# Patient Record
Sex: FEMALE | Race: WHITE | NOT HISPANIC OR LATINO | Employment: OTHER | URBAN - METROPOLITAN AREA
[De-identification: names, ages, dates, MRNs, and addresses within clinical notes are randomized per-mention and may not be internally consistent; named-entity substitution may affect disease eponyms.]

---

## 2023-04-22 PROBLEM — M85.9 DISORDER OF BONE DENSITY AND STRUCTURE, UNSPECIFIED: Status: ACTIVE | Noted: 2023-04-22

## 2023-04-22 PROBLEM — R59.0 LYMPHADENOPATHY, INGUINAL: Status: ACTIVE | Noted: 2023-04-22

## 2023-04-22 PROBLEM — M76.829 PTTD (POSTERIOR TIBIAL TENDON DYSFUNCTION): Status: ACTIVE | Noted: 2023-04-22

## 2023-04-22 PROBLEM — E55.9 VITAMIN D DEFICIENCY: Status: ACTIVE | Noted: 2023-04-22

## 2023-04-22 PROBLEM — Z00.00 ROUTINE ADULT HEALTH MAINTENANCE: Status: ACTIVE | Noted: 2023-04-22

## 2023-04-22 PROBLEM — Z71.89 CARDIAC RISK COUNSELING: Status: ACTIVE | Noted: 2023-04-22

## 2023-04-22 PROBLEM — I87.8 VENOUS STASIS OF LOWER EXTREMITY: Status: ACTIVE | Noted: 2023-04-22

## 2023-04-22 PROBLEM — Z87.19 H/O ESOPHAGITIS: Status: ACTIVE | Noted: 2023-04-22

## 2023-04-22 PROBLEM — J30.89 OTHER ALLERGIC RHINITIS: Status: ACTIVE | Noted: 2023-04-22

## 2023-04-22 PROBLEM — D50.0 IRON DEFICIENCY ANEMIA DUE TO CHRONIC BLOOD LOSS: Status: ACTIVE | Noted: 2023-04-22

## 2023-04-22 PROBLEM — I10 HYPERTENSION, ESSENTIAL: Status: ACTIVE | Noted: 2023-04-22

## 2023-04-22 PROBLEM — K21.9 GERD WITHOUT ESOPHAGITIS: Status: ACTIVE | Noted: 2023-04-22

## 2023-04-22 PROBLEM — H91.93 HEARING LOSS, BILATERAL: Status: ACTIVE | Noted: 2023-04-22

## 2023-04-22 PROBLEM — Z13.89 SCREENING FOR MULTIPLE CONDITIONS: Status: ACTIVE | Noted: 2023-04-22

## 2023-04-22 PROBLEM — E87.1 HYPONATREMIA: Status: ACTIVE | Noted: 2023-04-22

## 2023-04-22 PROBLEM — Z71.89 ADVANCED DIRECTIVES, COUNSELING/DISCUSSION: Status: ACTIVE | Noted: 2023-04-22

## 2023-04-22 PROBLEM — M19.90 ARTHRITIS: Status: ACTIVE | Noted: 2023-04-22

## 2023-04-22 PROBLEM — R93.1 ABNORMAL SCREENING CARDIAC CT: Status: ACTIVE | Noted: 2023-04-22

## 2023-04-22 PROBLEM — S43.429A ROTATOR CUFF SPRAIN: Status: ACTIVE | Noted: 2023-04-22

## 2023-04-22 PROBLEM — Z87.19 H/O ESOPHAGEAL ULCER: Status: ACTIVE | Noted: 2023-04-22

## 2023-04-22 PROBLEM — K57.30 DIVERTICULOSIS OF COLON: Status: ACTIVE | Noted: 2023-04-22

## 2023-05-01 ENCOUNTER — TELEPHONE (OUTPATIENT)
Dept: PRIMARY CARE | Facility: CLINIC | Age: 73
End: 2023-05-01
Payer: MEDICARE

## 2023-05-01 DIAGNOSIS — I10 HYPERTENSION, ESSENTIAL: ICD-10-CM

## 2023-05-01 DIAGNOSIS — E55.9 VITAMIN D DEFICIENCY: ICD-10-CM

## 2023-05-01 DIAGNOSIS — E78.5 HYPERLIPIDEMIA, UNSPECIFIED HYPERLIPIDEMIA TYPE: ICD-10-CM

## 2023-05-03 ENCOUNTER — LAB (OUTPATIENT)
Dept: LAB | Facility: LAB | Age: 73
End: 2023-05-03
Payer: MEDICARE

## 2023-05-03 DIAGNOSIS — I10 HYPERTENSION, ESSENTIAL: ICD-10-CM

## 2023-05-03 DIAGNOSIS — E55.9 VITAMIN D DEFICIENCY: ICD-10-CM

## 2023-05-03 DIAGNOSIS — E78.5 HYPERLIPIDEMIA, UNSPECIFIED HYPERLIPIDEMIA TYPE: ICD-10-CM

## 2023-05-03 LAB
ALANINE AMINOTRANSFERASE (SGPT) (U/L) IN SER/PLAS: 17 U/L (ref 7–45)
ALBUMIN (G/DL) IN SER/PLAS: 4.6 G/DL (ref 3.4–5)
ALKALINE PHOSPHATASE (U/L) IN SER/PLAS: 81 U/L (ref 33–136)
ANION GAP IN SER/PLAS: 13 MMOL/L (ref 10–20)
APPEARANCE, URINE: CLEAR
ASPARTATE AMINOTRANSFERASE (SGOT) (U/L) IN SER/PLAS: 25 U/L (ref 9–39)
BASOPHILS (10*3/UL) IN BLOOD BY AUTOMATED COUNT: 0.05 X10E9/L (ref 0–0.1)
BASOPHILS/100 LEUKOCYTES IN BLOOD BY AUTOMATED COUNT: 0.8 % (ref 0–2)
BILIRUBIN TOTAL (MG/DL) IN SER/PLAS: 0.8 MG/DL (ref 0–1.2)
BILIRUBIN, URINE: NEGATIVE
BLOOD, URINE: NEGATIVE
CALCIDIOL (25 OH VITAMIN D3) (NG/ML) IN SER/PLAS: 48 NG/ML
CALCIUM (MG/DL) IN SER/PLAS: 9.9 MG/DL (ref 8.6–10.3)
CARBON DIOXIDE, TOTAL (MMOL/L) IN SER/PLAS: 28 MMOL/L (ref 21–32)
CHLORIDE (MMOL/L) IN SER/PLAS: 93 MMOL/L (ref 98–107)
CHOLESTEROL (MG/DL) IN SER/PLAS: 205 MG/DL (ref 0–199)
CHOLESTEROL IN HDL (MG/DL) IN SER/PLAS: 101.9 MG/DL
CHOLESTEROL/HDL RATIO: 2
COLOR, URINE: YELLOW
CREATININE (MG/DL) IN SER/PLAS: 0.61 MG/DL (ref 0.5–1.05)
EOSINOPHILS (10*3/UL) IN BLOOD BY AUTOMATED COUNT: 0.19 X10E9/L (ref 0–0.4)
EOSINOPHILS/100 LEUKOCYTES IN BLOOD BY AUTOMATED COUNT: 3.1 % (ref 0–6)
ERYTHROCYTE DISTRIBUTION WIDTH (RATIO) BY AUTOMATED COUNT: 13.3 % (ref 11.5–14.5)
ERYTHROCYTE MEAN CORPUSCULAR HEMOGLOBIN CONCENTRATION (G/DL) BY AUTOMATED: 34.1 G/DL (ref 32–36)
ERYTHROCYTE MEAN CORPUSCULAR VOLUME (FL) BY AUTOMATED COUNT: 89 FL (ref 80–100)
ERYTHROCYTES (10*6/UL) IN BLOOD BY AUTOMATED COUNT: 4.15 X10E12/L (ref 4–5.2)
GFR FEMALE: >90 ML/MIN/1.73M2
GLUCOSE (MG/DL) IN SER/PLAS: 87 MG/DL (ref 74–99)
GLUCOSE, URINE: NEGATIVE MG/DL
HEMATOCRIT (%) IN BLOOD BY AUTOMATED COUNT: 37 % (ref 36–46)
HEMOGLOBIN (G/DL) IN BLOOD: 12.6 G/DL (ref 12–16)
IMMATURE GRANULOCYTES/100 LEUKOCYTES IN BLOOD BY AUTOMATED COUNT: 0.2 % (ref 0–0.9)
KETONES, URINE: NEGATIVE MG/DL
LDL: 91 MG/DL (ref 0–99)
LEUKOCYTE ESTERASE, URINE: ABNORMAL
LEUKOCYTES (10*3/UL) IN BLOOD BY AUTOMATED COUNT: 6.1 X10E9/L (ref 4.4–11.3)
LYMPHOCYTES (10*3/UL) IN BLOOD BY AUTOMATED COUNT: 1.85 X10E9/L (ref 0.8–3)
LYMPHOCYTES/100 LEUKOCYTES IN BLOOD BY AUTOMATED COUNT: 30.6 % (ref 13–44)
MONOCYTES (10*3/UL) IN BLOOD BY AUTOMATED COUNT: 0.71 X10E9/L (ref 0.05–0.8)
MONOCYTES/100 LEUKOCYTES IN BLOOD BY AUTOMATED COUNT: 11.7 % (ref 2–10)
NEUTROPHILS (10*3/UL) IN BLOOD BY AUTOMATED COUNT: 3.24 X10E9/L (ref 1.6–5.5)
NEUTROPHILS/100 LEUKOCYTES IN BLOOD BY AUTOMATED COUNT: 53.6 % (ref 40–80)
NITRITE, URINE: NEGATIVE
PH, URINE: 8 (ref 5–8)
PLATELETS (10*3/UL) IN BLOOD AUTOMATED COUNT: 343 X10E9/L (ref 150–450)
POTASSIUM (MMOL/L) IN SER/PLAS: 4.1 MMOL/L (ref 3.5–5.3)
PROTEIN TOTAL: 7.1 G/DL (ref 6.4–8.2)
PROTEIN, URINE: NEGATIVE MG/DL
RBC, URINE: NORMAL /HPF (ref 0–5)
SODIUM (MMOL/L) IN SER/PLAS: 130 MMOL/L (ref 136–145)
SPECIFIC GRAVITY, URINE: 1.01 (ref 1–1.03)
SQUAMOUS EPITHELIAL CELLS, URINE: NORMAL /HPF
THYROTROPIN (MIU/L) IN SER/PLAS BY DETECTION LIMIT <= 0.05 MIU/L: 1.56 MIU/L (ref 0.44–3.98)
TRIGLYCERIDE (MG/DL) IN SER/PLAS: 61 MG/DL (ref 0–149)
UREA NITROGEN (MG/DL) IN SER/PLAS: 11 MG/DL (ref 6–23)
UROBILINOGEN, URINE: <2 MG/DL (ref 0–1.9)
VLDL: 12 MG/DL (ref 0–40)
WBC, URINE: NORMAL /HPF (ref 0–5)

## 2023-05-03 PROCEDURE — 80061 LIPID PANEL: CPT

## 2023-05-03 PROCEDURE — 84443 ASSAY THYROID STIM HORMONE: CPT

## 2023-05-03 PROCEDURE — 85025 COMPLETE CBC W/AUTO DIFF WBC: CPT

## 2023-05-03 PROCEDURE — 80053 COMPREHEN METABOLIC PANEL: CPT

## 2023-05-03 PROCEDURE — 36415 COLL VENOUS BLD VENIPUNCTURE: CPT

## 2023-05-03 PROCEDURE — 81001 URINALYSIS AUTO W/SCOPE: CPT

## 2023-05-03 PROCEDURE — 82306 VITAMIN D 25 HYDROXY: CPT

## 2023-05-16 ENCOUNTER — OFFICE VISIT (OUTPATIENT)
Dept: PRIMARY CARE | Facility: CLINIC | Age: 73
End: 2023-05-16
Payer: MEDICARE

## 2023-05-16 VITALS
BODY MASS INDEX: 24.32 KG/M2 | TEMPERATURE: 97.3 F | WEIGHT: 137.25 LBS | SYSTOLIC BLOOD PRESSURE: 127 MMHG | DIASTOLIC BLOOD PRESSURE: 67 MMHG | HEIGHT: 63 IN | HEART RATE: 59 BPM

## 2023-05-16 DIAGNOSIS — I10 HYPERTENSION, ESSENTIAL: ICD-10-CM

## 2023-05-16 DIAGNOSIS — Z71.89 ADVANCED DIRECTIVES, COUNSELING/DISCUSSION: ICD-10-CM

## 2023-05-16 DIAGNOSIS — K21.9 GERD WITHOUT ESOPHAGITIS: ICD-10-CM

## 2023-05-16 DIAGNOSIS — E87.1 HYPONATREMIA: ICD-10-CM

## 2023-05-16 DIAGNOSIS — Z12.31 ENCOUNTER FOR SCREENING MAMMOGRAM FOR BREAST CANCER: ICD-10-CM

## 2023-05-16 DIAGNOSIS — Z71.89 CARDIAC RISK COUNSELING: ICD-10-CM

## 2023-05-16 DIAGNOSIS — Z23 IMMUNIZATION DUE: ICD-10-CM

## 2023-05-16 DIAGNOSIS — Z13.89 SCREENING FOR MULTIPLE CONDITIONS: ICD-10-CM

## 2023-05-16 DIAGNOSIS — Z00.00 ROUTINE ADULT HEALTH MAINTENANCE: Primary | ICD-10-CM

## 2023-05-16 DIAGNOSIS — Z87.19 H/O ESOPHAGEAL ULCER: ICD-10-CM

## 2023-05-16 DIAGNOSIS — Z78.0 MENOPAUSE: ICD-10-CM

## 2023-05-16 DIAGNOSIS — I73.00 RAYNAUD'S PHENOMENON WITHOUT GANGRENE: ICD-10-CM

## 2023-05-16 DIAGNOSIS — E78.5 HYPERLIPIDEMIA, UNSPECIFIED HYPERLIPIDEMIA TYPE: ICD-10-CM

## 2023-05-16 DIAGNOSIS — E55.9 VITAMIN D DEFICIENCY: ICD-10-CM

## 2023-05-16 PROBLEM — J30.9 ALLERGIC RHINITIS: Status: ACTIVE | Noted: 2023-04-22

## 2023-05-16 PROBLEM — R59.0 LYMPHADENOPATHY, INGUINAL: Status: RESOLVED | Noted: 2023-04-22 | Resolved: 2023-05-16

## 2023-05-16 PROBLEM — H26.9 CATARACT: Status: ACTIVE | Noted: 2023-05-16

## 2023-05-16 PROBLEM — S43.429A ROTATOR CUFF SPRAIN: Status: RESOLVED | Noted: 2023-04-22 | Resolved: 2023-05-16

## 2023-05-16 PROBLEM — M76.829 PTTD (POSTERIOR TIBIAL TENDON DYSFUNCTION): Status: RESOLVED | Noted: 2023-04-22 | Resolved: 2023-05-16

## 2023-05-16 PROBLEM — D50.0 IRON DEFICIENCY ANEMIA DUE TO CHRONIC BLOOD LOSS: Status: RESOLVED | Noted: 2023-04-22 | Resolved: 2023-05-16

## 2023-05-16 PROCEDURE — G0444 DEPRESSION SCREEN ANNUAL: HCPCS | Performed by: INTERNAL MEDICINE

## 2023-05-16 PROCEDURE — 1036F TOBACCO NON-USER: CPT | Performed by: INTERNAL MEDICINE

## 2023-05-16 PROCEDURE — 1159F MED LIST DOCD IN RCRD: CPT | Performed by: INTERNAL MEDICINE

## 2023-05-16 PROCEDURE — 3078F DIAST BP <80 MM HG: CPT | Performed by: INTERNAL MEDICINE

## 2023-05-16 PROCEDURE — 3074F SYST BP LT 130 MM HG: CPT | Performed by: INTERNAL MEDICINE

## 2023-05-16 PROCEDURE — G0439 PPPS, SUBSEQ VISIT: HCPCS | Performed by: INTERNAL MEDICINE

## 2023-05-16 PROCEDURE — 99497 ADVNCD CARE PLAN 30 MIN: CPT | Performed by: INTERNAL MEDICINE

## 2023-05-16 PROCEDURE — 1160F RVW MEDS BY RX/DR IN RCRD: CPT | Performed by: INTERNAL MEDICINE

## 2023-05-16 PROCEDURE — 99214 OFFICE O/P EST MOD 30 MIN: CPT | Performed by: INTERNAL MEDICINE

## 2023-05-16 PROCEDURE — G0446 INTENS BEHAVE THER CARDIO DX: HCPCS | Performed by: INTERNAL MEDICINE

## 2023-05-16 PROCEDURE — 90471 IMMUNIZATION ADMIN: CPT | Performed by: INTERNAL MEDICINE

## 2023-05-16 PROCEDURE — 90715 TDAP VACCINE 7 YRS/> IM: CPT | Performed by: INTERNAL MEDICINE

## 2023-05-16 RX ORDER — PNV NO.95/FERROUS FUM/FOLIC AC 28MG-0.8MG
100 TABLET ORAL DAILY
COMMUNITY

## 2023-05-16 RX ORDER — CETIRIZINE HYDROCHLORIDE 10 MG/1
10 TABLET ORAL DAILY
COMMUNITY
Start: 2013-01-28

## 2023-05-16 RX ORDER — HYDROCHLOROTHIAZIDE 25 MG/1
25 TABLET ORAL DAILY
COMMUNITY
Start: 2011-04-18 | End: 2023-05-16 | Stop reason: SINTOL

## 2023-05-16 RX ORDER — MULTIVITAMIN
1 TABLET ORAL DAILY
COMMUNITY
Start: 2019-11-18

## 2023-05-16 RX ORDER — PHENAZOPYRIDINE HYDROCHLORIDE 200 MG/1
200 TABLET, FILM COATED ORAL
COMMUNITY
Start: 2019-05-20 | End: 2023-05-16 | Stop reason: ALTCHOICE

## 2023-05-16 RX ORDER — PANTOPRAZOLE SODIUM 20 MG/1
20 TABLET, DELAYED RELEASE ORAL
Qty: 90 TABLET | Refills: 3 | Status: SHIPPED | OUTPATIENT
Start: 2023-05-16 | End: 2024-04-16 | Stop reason: SDUPTHER

## 2023-05-16 RX ORDER — ASCORBIC ACID 500 MG
500 TABLET ORAL DAILY
COMMUNITY

## 2023-05-16 RX ORDER — LATANOPROST 50 UG/ML
1 SOLUTION/ DROPS OPHTHALMIC NIGHTLY
COMMUNITY
Start: 2022-07-07

## 2023-05-16 RX ORDER — NAPROXEN SODIUM 220 MG/1
81 TABLET, FILM COATED ORAL DAILY
COMMUNITY
End: 2023-05-16 | Stop reason: ALTCHOICE

## 2023-05-16 RX ORDER — PANTOPRAZOLE SODIUM 40 MG/1
40 TABLET, DELAYED RELEASE ORAL
COMMUNITY
Start: 2015-10-20 | End: 2023-05-16 | Stop reason: ALTCHOICE

## 2023-05-16 RX ORDER — IBUPROFEN 200 MG
1 CAPSULE ORAL DAILY
COMMUNITY
Start: 2019-11-18

## 2023-05-16 RX ORDER — ROSUVASTATIN CALCIUM 10 MG/1
10 TABLET, COATED ORAL 2 TIMES WEEKLY
COMMUNITY
Start: 2020-12-03 | End: 2023-06-18

## 2023-05-16 RX ORDER — AMLODIPINE BESYLATE 2.5 MG/1
2.5 TABLET ORAL DAILY
Qty: 90 TABLET | Refills: 3 | Status: SHIPPED | OUTPATIENT
Start: 2023-05-16 | End: 2024-04-16 | Stop reason: SDUPTHER

## 2023-05-16 RX ORDER — LOSARTAN POTASSIUM 25 MG/1
25 TABLET ORAL
COMMUNITY
Start: 2022-04-05 | End: 2023-12-01

## 2023-05-16 RX ORDER — MELOXICAM 7.5 MG/1
7.5 TABLET ORAL 2 TIMES DAILY
COMMUNITY
End: 2024-05-31 | Stop reason: SDUPTHER

## 2023-05-16 RX ORDER — ACETAMINOPHEN 500 MG
50 TABLET ORAL DAILY
COMMUNITY
Start: 2018-11-06 | End: 2023-05-16 | Stop reason: ALTCHOICE

## 2023-05-16 ASSESSMENT — PATIENT HEALTH QUESTIONNAIRE - PHQ9
SUM OF ALL RESPONSES TO PHQ9 QUESTIONS 1 AND 2: 0
2. FEELING DOWN, DEPRESSED OR HOPELESS: NOT AT ALL
1. LITTLE INTEREST OR PLEASURE IN DOING THINGS: NOT AT ALL

## 2023-05-16 NOTE — PROGRESS NOTES
Assessment and Plan:  Problem List Items Addressed This Visit          High    Cardiac risk counseling    Overview     5/16/23: Current 10-Year ASCVD Risk: 32.07% - High Risk   ASA not rec;d per updated sammi (also hx esophageal ulcer)         Routine adult health maintenance - Primary    Overview     Pfizer COVID-19 vaccine 2/27/21. 3/27/21, 10/6/21  Mamm 6/18/18, 8/19; 9/3/20; 9/20/21; 9/22/22  BMD 2005; 12/19, 12/20/21  Cscope 2015 (5yrs); 1/20 (5yrs)  Influenza vaccine 11/18/19, 10/20/20, 10/1/21, 10/1/22  TDAP 1/24/14  Shingrix 5/26/21, 8/9/21  Prevnar 3/27/15  Pneumovax 23 12/3/20  Zostavax 5/4/15  s/p TAHBSO  4/22/22 Current 10-Year ASCVD Risk:  21.65% - High Risk         Current Assessment & Plan     Annual Wellness exam completed   Preventive Health history reviewed:  Vaccines today: TDAP  Labs ordered    Mammogram ordered  BMD ordered  Depression Screening done  Advanced Directives Discussion Completed  Cardiovascular risk discussed and if needed, lifestyle modifications recommended, including nutritional choices, exercise, and elimination of habits contributing to risk.  We agreed on a plan to reduce the current cardiovascular risk.  See ecalc ASCVD Risk  Plus for data discussed regarding risk and risk reduction opportunities.  Aspirin use/disuse was discussed after reviewing the updated guidelines.            Screening for multiple conditions    Overview     Depression screening completed              Medium    Advanced directives, counseling/discussion    Overview     5/16/23: Cipriano Mckeon () is her HCPOA  No extraordinary measures desired         GERD without esophagitis    Overview     Controlled with PPI  EGD 10/15: LA grade C reflux esophagitis            2/16: normal         Current Assessment & Plan     Try reduction in dose         H/O esophageal ulcer    Overview     Managed by GI. Contiue PPI.   found on EGD by  Dr Connell -  EGD X 3 -  bleeding esophageal lesion - EGD  every two years recommneded.         Relevant Medications    pantoprazole (Protonix) 20 mg EC tablet    Hyperlipidemia    Overview     Goal LDL <100   on statin twice weekly   Used to take Tricor  AE with Zocor (tolerated Crestor once weekly);          Relevant Orders    TSH with reflex to Free T4 if abnormal    Comprehensive Metabolic Panel    CBC and Auto Differential    Lipid Panel    Hypertension, essential    Overview     2/2020:    home cuff: 139/81 p 61   office cuff: 138/82 p 60;   Goal BP <130/80   Lisinopril caused cough, but tolerable was d'c due to low BP with HCTZ;   On HCTZ and ARB (AE on 50mg Losartan, LH/imbalance)   Hyponatremia likely due to one of these meds         Current Assessment & Plan     Will try replacing diuretic with CCB (to see if helps raynauds and avoids hyponatremia)         Relevant Medications    amLODIPine (Norvasc) 2.5 mg tablet    Other Relevant Orders    Urinalysis with Reflex Microscopic    Comprehensive Metabolic Panel    CBC and Auto Differential    Lipid Panel    Albumin    Hyponatremia    Overview     5/23: 130  ? Due to ARB or HCTZ         Current Assessment & Plan     Will stop hydrochlorothiazide and reassess  Will check additional labsand do nonfasting         Relevant Orders    Sodium    Osmolality    Sodium, urine, random    Osmolality, urine    Raynaud's phenomenon without gangrene    Current Assessment & Plan     Will try CCB         Relevant Medications    amLODIPine (Norvasc) 2.5 mg tablet    Vitamin D deficiency    Overview     Goal 40-50;         Relevant Orders    Vitamin D, Total    Comprehensive Metabolic Panel    CBC and Auto Differential     Other Visit Diagnoses       Immunization due        Relevant Orders    Tdap vaccine, age 10 years and older (BOOSTRIX) (Completed)    Encounter for screening mammogram for breast cancer        Relevant Orders    BI mammo bilateral screening tomosynthesis    Menopause        Relevant Orders    XR DEXA bone density             Chief Complaint:   Medicare Wellness Exam/Comprehensive Problem Focused Follow Up and Physical Exam    HPI: medicare wellness         Hands always cold and numb  They turn red white and blue    LE from last year was ortho issue  Peroneal tendonitis  Ortho notes reviewed(Copied):  Provider Impressions  5/22:  Impression:      Posterior tibial tendon insufficiency right foot, midfoot, with midfoot arthritis.       Treatment Plan:      The risks/benefits of a cortisone injection include infection, local skin irritation, skin atrophy, calcification, continued pain/discomfort, elevated blood sugar, burning, failure to relieve pain, possible late infection.  Post-op discomfort can be alleviated with additional medications/ice/elevation/rest over the first 24 hours as recommended.  After explaining these issues to the patient, it was understood.  The injection site was sprayed with ethyl chloride and injection 2 cc of Celestone 30 mg/5ml and 6 cc lidocaine plain 1% was injected in the left shoulder under routine office sterile conditions and a Band-Aid was applied.        3/23 visit also  Diagnoses/Problems  Assessed  Tendinitis of left rotator cuff (726.10) (M75.82)  Provider Impressions  TREATMENT PLAN:  Cortisone injection given.        BP at home 127/67  Last night 108/61 HR 58  2/2020:    home cuff: 139/81 p 61   office cuff: 138/82 p 60    Labs reviewed:  Component      Latest Ref Rng 5/3/2023   WBC      4.4 - 11.3 x10E9/L 6.1    RBC      4.00 - 5.20 x10E12/L 4.15    HEMOGLOBIN      12.0 - 16.0 g/dL 12.6    HEMATOCRIT      36.0 - 46.0 % 37.0    MCV      80 - 100 fL 89    MCHC      32.0 - 36.0 g/dL 34.1    Platelets      150 - 450 x10E9/L 343    RED CELL DISTRIBUTION WIDTH      11.5 - 14.5 % 13.3    Neutrophils %      40.0 - 80.0 % 53.6    Immature Granulocytes %, Automated      0.0 - 0.9 % 0.2    Lymphocytes %      13.0 - 44.0 % 30.6    Monocytes %      2.0 - 10.0 % 11.7    Eosinophils %      0.0 - 6.0 % 3.1     Basophils %      0.0 - 2.0 % 0.8    Neutrophils Absolute      1.60 - 5.50 x10E9/L 3.24    Lymphocytes Absolute      0.80 - 3.00 x10E9/L 1.85    Monocytes Absolute      0.05 - 0.80 x10E9/L 0.71    Eosinophils Absolute      0.00 - 0.40 x10E9/L 0.19    Basophils Absolute      0.00 - 0.10 x10E9/L 0.05    GLUCOSE      74 - 99 mg/dL 87    SODIUM      136 - 145 mmol/L 130 (L)    POTASSIUM      3.5 - 5.3 mmol/L 4.1    CHLORIDE      98 - 107 mmol/L 93 (L)    Bicarbonate      21 - 32 mmol/L 28    Anion Gap      10 - 20 mmol/L 13    Blood Urea Nitrogen      6 - 23 mg/dL 11    Creatinine      0.50 - 1.05 mg/dL 0.61    GFR Female      >90 mL/min/1.73m2 >90    Calcium      8.6 - 10.3 mg/dL 9.9    Albumin      3.4 - 5.0 g/dL 4.6    Alkaline Phosphatase      33 - 136 U/L 81    Total Protein      6.4 - 8.2 g/dL 7.1    AST      9 - 39 U/L 25    Bilirubin Total      0.0 - 1.2 mg/dL 0.8    ALT      7 - 45 U/L 17    Color, Urine      STRAW,YELLOW  YELLOW    Appearance, Urine      CLEAR  CLEAR    Specific Gravity, Urine      1.005 - 1.035  1.008    pH, Urine      5.0 - 8.0  8.0    Protein, Urine      NEGATIVE mg/dL NEGATIVE    Glucose, Urine      NEGATIVE mg/dL NEGATIVE    Blood, Urine      NEGATIVE  NEGATIVE    Ketones, Urine      NEGATIVE mg/dL NEGATIVE    Bilirubin, Urine      NEGATIVE  NEGATIVE    Urobilinogen, Urine      0.0 - 1.9 mg/dL <2.0    Nitrite, Urine      NEGATIVE  NEGATIVE    Leukocyte Esterase, Urine      NEGATIVE  SMALL (1+) !    CHOLESTEROL      0 - 199 mg/dL 205 (H)    HDL CHOLESTEROL      mg/dL 101.9    Cholesterol/HDL Ratio 2.0    LDL      0 - 99 mg/dL 91    VLDL      0 - 40 mg/dL 12    TRIGLYCERIDES      0 - 149 mg/dL 61    WBC, Urine      0 - 5 /HPF 0-5    RBC, Urine      0 - 5 /HPF NONE    Squamous Epithelial Cells, Urine      /HPF 2+    Vitamin D, 25-Hydroxy, Total      ng/mL 48    Thyroid Stimulating Hormone      0.44 - 3.98 mIU/L 1.56      Sodium 136 - 145 mmol/L 131 Low  134 Low  132 Low  131 Low  138 135  Low  135 Low        Patient Care Team:  Ellie Garcia MD as PCP - General  Ellie Garcia MD as PCP - [employer] PCP   Ortho: [facility]    Active Problem List  Patient Active Problem List   Diagnosis    Routine adult health maintenance    Advanced directives, counseling/discussion    Cardiac risk counseling    Screening for multiple conditions    Abnormal screening cardiac CT    Allergic rhinitis    Arthritis    TMJ (temporomandibular joint disorder)    Disorder of bone density and structure, unspecified    Diverticulosis of colon    GERD without esophagitis    Hearing loss, bilateral    Hyperlipidemia    Hypertension, essential    Hyponatremia    Venous stasis of lower extremity    Vitamin D deficiency    H/O esophagitis    H/O esophageal ulcer    Cataract    Raynaud's phenomenon without gangrene         Comprehensive Medical/Surgical/Social/Family History  Past Medical History:   Diagnosis Date    Abnormal screening cardiac CT     2018: CT calcium score =19    H/O bone density study     12/21: T score- 2.0 FRAX* 10-year Probability of Fracture Based on femoral neck BMD: DualFemur (Left) Major Osteoporotic Fracture: 19.5% Hip Fracture:                4.3% 10/16: -1.8 12/19: Ten Year Major Osteoporotic Fracture Risk: 10.42%  Ten Year Hip Fracture Risk: 1.76%  T-Score: -1.8    H/O CT scan of abdomen     4/22: 1. Copious feces. 2. Diverticulosis. 3. Nonspecific fairly mild gastric through proximal jejunal distension. 4. Spinal degenerative changes include marked lower lumbar facet joint DJD accounting for grade I/IV spondylolisthesis of L4 on L5. Mild scoliosis. 5. A 1.6 cm wide umbilical hernia contains only fat. 6. Diastasis recti.     Past Surgical History:   Procedure Laterality Date    BLADDER SUSPENSION  11/18/2019    CARDIAC CATHETERIZATION  11/18/2019 2010 - Normal    COLONOSCOPY  01/30/2020    1/15: diverticulosis (5yrs) 2009:mild diverticulosis sigmoid (7yrs) 1/20: diverticulosis     ESOPHAGOGASTRODUODENOSCOPY  2019    10/15: LA grade C reflux esophagitis : normal    OTHER SURGICAL HISTORY  2018    Oral surgery    OTHER SURGICAL HISTORY  2018    Tonsillectomy    OTHER SURGICAL HISTORY  2018    Foot surgery    OTHER SURGICAL HISTORY  2018    Rotator cuff repair    OTHER SURGICAL HISTORY  2019    [place] tooth extraction    OTHER SURGICAL HISTORY  2019    Appendectomy    OTHER SURGICAL HISTORY  2019    Carpal tunnel surgery    OTHER SURGICAL HISTORY  2019    Colposcopy    TOTAL ABDOMINAL HYSTERECTOMY W/ BILATERAL SALPINGOOPHORECTOMY  2019    Hysterectomy    TOTAL KNEE ARTHROPLASTY  2019    Right - -     Social History     Social History Narrative        3 kids, 2 stepkids    Retired     Nonsmoker    Social ETOH    ---    Family History:    Josh: Breast Cancer       F:  CAD (CABG age 61), HTN, HPL, Bile Duct Cancer ( 67)    Cousin:  leukemia                               M:   Coronary Artery Disease/CABG age 69, HTN, HPL ( age 89)            B: HPL, HTN    B: HPL, CAD/PTCA    S: HTN    S: Cataracts     Tobacco/Alcohol/Opioid use, as well as Illicit Drug Use was screened for/reviewed and documented in Social Documentation section of the chart and medication list as appropriate    Allergies and Medications  Atorvastatin, Hydrocodone-acetaminophen, Indomethacin, and Lisinopril  Current Outpatient Medications   Medication Instructions    amLODIPine (NORVASC) 2.5 mg, oral, Daily    ascorbic acid (VITAMIN C) 500 mg, oral, Daily    Bacillus subtilis-inulin 1.5 billion cell-1 gram tablet,chewable oral    calcium carbonate-vitamin D3 500 mg-3.125 mcg (125 unit) tablet tablet 1 tablet, oral, Daily    cetirizine (ZYRTEC) 10 mg, oral, Daily    cyanocobalamin (VITAMIN B-12) 100 mcg, oral, Daily    latanoprost (Xalatan) 0.005 % ophthalmic solution 1 drop, Both Eyes, Nightly    losartan (COZAAR) 25 mg, oral,  "Daily RT    meloxicam (MOBIC) 7.5 mg, oral, Daily    multivitamin tablet 1 tablet, oral, Daily    pantoprazole (PROTONIX) 20 mg, oral, Daily before breakfast, Do not crush, chew, or split.     rosuvastatin (CRESTOR) 10 mg, oral, 2 times weekly     Medications and Supplements  prescribed by me and other practitioners or clinical pharmacist (such as prescriptions, OTC's, herbal therapies and supplements) were reviewed and documented in the medical record.      Activities of Daily Living  In your present state of health, do you have any difficulty performing the following activities?:   Preparing food and eating?: No  Bathing yourself: No  Getting dressed: No  Using the toilet:No  Moving around from place to place: No  In the past year have you fallen or had a near fall?:Yes  Able to manage finances independently: Yes  Able to perform grocery shopping: Yes  Able to manage medications independently: Yes  Able to do housework independently: Yes  Patient self-assessment of health status? Excellent    Depression Screen  (Note: if answer to either of the following is \"Yes\", then a more complete depression screening is indicated)   Q1: Over the past two weeks, have you felt down, depressed or hopeless? No  Q2: Over the past two weeks, have you felt little interest or pleasure in doing things? No    [facility] and walks 6 days a week   Dietary issues discussed: Yes  Hearing difficulties: Yes  Safe in current home environment: Yes  Visual Acuity assessed: No  Cognitive Impairment No    Advance directives  Advanced Care Planning (including a Living Will, Healthcare POA, as well as specific end of life choices and/or directives), was discussed for approximately 16 minutes with the patient and/or surrogate, voluntarily, and documented in the Problem List of the medical record.     Cardiac Risk Assessment  Cardiovascular risk was discussed and, if needed, lifestyle modifications " "recommended, including nutritional choices, exercise, and elimination of habits contributing to risk. We agreed on a plan to reduce the current cardiovascular risk based on above discussion as needed.  Aspirin use/disuse was discussed and documented in the Problem List of the medical record after reviewing the updated guidelines below:    Consider low dose Aspirin ( mg) use if the benefit for cardiovascular disease prevention outweighs risk for bleeding complications.   In general, low dose ASA should be considered:  In patients WITHOUT prior MI/stroke/PAD (primary prevention):   a. Age <60: Use if 10-year cardiovascular disease risk >20%, with discussion of risks and benefits with patient  b. Age 60-<70: Use if 10-year cardiovascular disease risk >20% and low bleeding (e.g., gastrointenstinal) risk, with discussion of risks and benefits with patient  c. Age >=70: Do not use    In patients WITH prior MI/stroke/PAD (secondary prevention):   Generally use unless extremely high bleeding (e.g., gastrointenstinal) risk, with discussion of risks and benefits with patient    ROS otherwise negative aside from what was mentioned above in HPI.    Vitals  /67 Comment: at home  Pulse 59   Temp 36.3 °C (97.3 °F)   Ht 1.6 m (5' 3\")   Wt 62.3 kg (137 lb 4 oz)   BMI 24.31 kg/m²   Body mass index is 24.31 kg/m².  Physical Exam  Gen: Alert, NAD  HEENT:  Unremarkable  Neck:  No RODNEY  Respiratory:  Lungs CTAB  Cardiovascular:  Heart RRR  Neuro:  Gross motor and sensory intact  Skin:  No suspicious lesions present  Breast: No masses, or axillary lymphadenopathy    During the course of the visit the patient was educated and counseled about age appropriate screening and preventive services. Completed preventive screenings were documented in the chart and orders were placed for outstanding screenings/procedures as documented in the Assessment and Plan.    Patient Instructions (the written plan) was given to the patient at " check out.

## 2023-05-16 NOTE — ASSESSMENT & PLAN NOTE
Annual Wellness exam completed   Preventive Health history reviewed:  Vaccines today: TDAP  Labs ordered    Mammogram ordered  BMD ordered  Depression Screening done  Advanced Directives Discussion Completed  Cardiovascular risk discussed and if needed, lifestyle modifications recommended, including nutritional choices, exercise, and elimination of habits contributing to risk.  We agreed on a plan to reduce the current cardiovascular risk.  See ecalc ASCVD Risk  Plus for data discussed regarding risk and risk reduction opportunities.  Aspirin use/disuse was discussed after reviewing the updated guidelines.

## 2023-06-07 ENCOUNTER — LAB (OUTPATIENT)
Dept: LAB | Facility: LAB | Age: 73
End: 2023-06-07
Payer: MEDICARE

## 2023-06-07 DIAGNOSIS — E87.1 HYPONATREMIA: ICD-10-CM

## 2023-06-07 LAB
CREATININE (MG/DL) IN URINE: 21.9 MG/DL (ref 20–320)
OSMOLALITY, RANDOM URINE: 213 MOSM/KG (ref 200–1200)
OSMOLALITY, SERUM: 284 MOSM/KG H2O (ref 280–300)
SODIUM (MMOL/L) IN SER/PLAS: 133 MMOL/L (ref 136–145)
SODIUM URINE RANDOM: 33 MMOL/L
SODIUM/CREATININE (MMOL/G) IN URINE: 151 MMOL/G CREAT

## 2023-06-07 PROCEDURE — 84295 ASSAY OF SERUM SODIUM: CPT

## 2023-06-07 PROCEDURE — 84300 ASSAY OF URINE SODIUM: CPT

## 2023-06-07 PROCEDURE — 36415 COLL VENOUS BLD VENIPUNCTURE: CPT

## 2023-06-07 PROCEDURE — 83930 ASSAY OF BLOOD OSMOLALITY: CPT

## 2023-06-07 PROCEDURE — 82570 ASSAY OF URINE CREATININE: CPT

## 2023-06-07 PROCEDURE — 83935 ASSAY OF URINE OSMOLALITY: CPT

## 2023-06-08 ENCOUNTER — TELEPHONE (OUTPATIENT)
Dept: PRIMARY CARE | Facility: CLINIC | Age: 73
End: 2023-06-08
Payer: MEDICARE

## 2023-06-08 DIAGNOSIS — E87.1 HYPONATREMIA: ICD-10-CM

## 2023-06-08 NOTE — TELEPHONE ENCOUNTER
Spoke with Pt  Relayed results  Relayed order for repeat BMP in 3 months  Added hydrochlorothiazide to allergy list  Pt verbally understood

## 2023-06-14 ENCOUNTER — TELEMEDICINE (OUTPATIENT)
Dept: PRIMARY CARE | Facility: CLINIC | Age: 73
End: 2023-06-14
Payer: MEDICARE

## 2023-06-14 VITALS — HEART RATE: 55 BPM | DIASTOLIC BLOOD PRESSURE: 76 MMHG | SYSTOLIC BLOOD PRESSURE: 131 MMHG

## 2023-06-14 DIAGNOSIS — E87.1 HYPONATREMIA: ICD-10-CM

## 2023-06-14 DIAGNOSIS — I10 HYPERTENSION, ESSENTIAL: Primary | ICD-10-CM

## 2023-06-14 PROCEDURE — 99213 OFFICE O/P EST LOW 20 MIN: CPT | Performed by: NURSE PRACTITIONER

## 2023-06-14 RX ORDER — HYDROCHLOROTHIAZIDE 25 MG/1
1 TABLET ORAL DAILY
COMMUNITY
End: 2023-06-14 | Stop reason: ALTCHOICE

## 2023-06-14 NOTE — ASSESSMENT & PLAN NOTE
Reviewed home readings  Am readings elevated  - encouraged to decrease caffeine  Afternoon and evening readings controlled  Will continue same dose norvasc & losartan, and continue to monitor

## 2023-06-14 NOTE — PATIENT INSTRUCTIONS
Recheck sodium level in Sept   - lab has been formatted and in non-fasting    Message Michelle later today with updated BP reading    For now continue same medications     Can try cutting down on caffeine daily

## 2023-06-14 NOTE — ASSESSMENT & PLAN NOTE
She is drinking a gallon of water every day  - may be dilutional  HTN now managed with CCB & ARB  Plan is to recheck BMP in a few months

## 2023-06-17 DIAGNOSIS — E78.5 HYPERLIPIDEMIA, UNSPECIFIED HYPERLIPIDEMIA TYPE: Primary | ICD-10-CM

## 2023-06-18 RX ORDER — ROSUVASTATIN CALCIUM 10 MG/1
TABLET, COATED ORAL
Qty: 24 TABLET | Refills: 3 | Status: SHIPPED | OUTPATIENT
Start: 2023-06-18 | End: 2024-06-06 | Stop reason: SDUPTHER

## 2023-08-08 LAB — URINE CULTURE: ABNORMAL

## 2023-09-07 ENCOUNTER — LAB (OUTPATIENT)
Dept: LAB | Facility: LAB | Age: 73
End: 2023-09-07
Payer: MEDICARE

## 2023-09-07 DIAGNOSIS — E87.1 HYPONATREMIA: ICD-10-CM

## 2023-09-07 LAB
ANION GAP IN SER/PLAS: 13 MMOL/L (ref 10–20)
CALCIUM (MG/DL) IN SER/PLAS: 9.9 MG/DL (ref 8.6–10.3)
CARBON DIOXIDE, TOTAL (MMOL/L) IN SER/PLAS: 29 MMOL/L (ref 21–32)
CHLORIDE (MMOL/L) IN SER/PLAS: 93 MMOL/L (ref 98–107)
CREATININE (MG/DL) IN SER/PLAS: 0.59 MG/DL (ref 0.5–1.05)
GFR FEMALE: >90 ML/MIN/1.73M2
GLUCOSE (MG/DL) IN SER/PLAS: 83 MG/DL (ref 74–99)
POTASSIUM (MMOL/L) IN SER/PLAS: 4 MMOL/L (ref 3.5–5.3)
SODIUM (MMOL/L) IN SER/PLAS: 131 MMOL/L (ref 136–145)
UREA NITROGEN (MG/DL) IN SER/PLAS: 10 MG/DL (ref 6–23)

## 2023-09-07 PROCEDURE — 36415 COLL VENOUS BLD VENIPUNCTURE: CPT

## 2023-09-07 PROCEDURE — 80048 BASIC METABOLIC PNL TOTAL CA: CPT

## 2023-09-08 ENCOUNTER — TELEPHONE (OUTPATIENT)
Dept: PRIMARY CARE | Facility: CLINIC | Age: 73
End: 2023-09-08
Payer: MEDICARE

## 2023-09-08 DIAGNOSIS — E87.1 HYPONATREMIA: ICD-10-CM

## 2023-09-08 DIAGNOSIS — E87.1 HYPONATREMIA: Primary | ICD-10-CM

## 2023-09-15 ENCOUNTER — LAB (OUTPATIENT)
Dept: LAB | Facility: LAB | Age: 73
End: 2023-09-15
Payer: MEDICARE

## 2023-09-15 DIAGNOSIS — E87.1 HYPONATREMIA: ICD-10-CM

## 2023-09-15 LAB
ANION GAP IN SER/PLAS: 15 MMOL/L (ref 10–20)
CALCIUM (MG/DL) IN SER/PLAS: 9.7 MG/DL (ref 8.6–10.3)
CARBON DIOXIDE, TOTAL (MMOL/L) IN SER/PLAS: 26 MMOL/L (ref 21–32)
CHLORIDE (MMOL/L) IN SER/PLAS: 99 MMOL/L (ref 98–107)
CREATININE (MG/DL) IN SER/PLAS: 0.63 MG/DL (ref 0.5–1.05)
GFR FEMALE: >90 ML/MIN/1.73M2
GLUCOSE (MG/DL) IN SER/PLAS: 84 MG/DL (ref 74–99)
POTASSIUM (MMOL/L) IN SER/PLAS: 4.7 MMOL/L (ref 3.5–5.3)
SODIUM (MMOL/L) IN SER/PLAS: 135 MMOL/L (ref 136–145)
UREA NITROGEN (MG/DL) IN SER/PLAS: 7 MG/DL (ref 6–23)

## 2023-09-15 PROCEDURE — 36415 COLL VENOUS BLD VENIPUNCTURE: CPT

## 2023-09-15 PROCEDURE — 80048 BASIC METABOLIC PNL TOTAL CA: CPT

## 2023-09-15 PROCEDURE — 83930 ASSAY OF BLOOD OSMOLALITY: CPT

## 2023-09-16 LAB — OSMOLALITY, SERUM: 281 MOSM/KG H2O (ref 280–300)

## 2023-10-06 ENCOUNTER — ANCILLARY PROCEDURE (OUTPATIENT)
Dept: RADIOLOGY | Facility: CLINIC | Age: 73
End: 2023-10-06
Payer: MEDICARE

## 2023-10-06 DIAGNOSIS — Z12.31 ENCOUNTER FOR SCREENING MAMMOGRAM FOR MALIGNANT NEOPLASM OF BREAST: ICD-10-CM

## 2023-10-06 DIAGNOSIS — Z78.0 ASYMPTOMATIC MENOPAUSAL STATE: ICD-10-CM

## 2023-10-06 PROBLEM — R30.0 DYSURIA: Status: ACTIVE | Noted: 2023-10-06

## 2023-10-06 PROBLEM — L03.119 CELLULITIS OF LOWER EXTREMITY: Status: ACTIVE | Noted: 2023-10-06

## 2023-10-06 PROBLEM — R60.9 EDEMA, PERIPHERAL: Status: ACTIVE | Noted: 2023-10-06

## 2023-10-06 PROBLEM — I25.10 CALCIFICATION OF CORONARY ARTERY: Status: ACTIVE | Noted: 2023-10-06

## 2023-10-06 PROBLEM — E66.9 OBESITY: Status: ACTIVE | Noted: 2023-10-06

## 2023-10-06 PROBLEM — M75.82 TENDINITIS OF LEFT ROTATOR CUFF: Status: ACTIVE | Noted: 2023-10-06

## 2023-10-06 PROBLEM — M70.70 HIP BURSITIS: Status: ACTIVE | Noted: 2023-10-06

## 2023-10-06 PROBLEM — R53.83 FATIGUE: Status: ACTIVE | Noted: 2023-10-06

## 2023-10-06 PROBLEM — M76.821 POSTERIOR TIBIAL TENDINITIS OF RIGHT LOWER EXTREMITY: Status: ACTIVE | Noted: 2023-10-06

## 2023-10-06 PROBLEM — M19.90 IDIOPATHIC OSTEOARTHRITIS: Status: ACTIVE | Noted: 2019-01-29

## 2023-10-06 PROBLEM — I99.8 VASCULAR INSUFFICIENCY: Status: ACTIVE | Noted: 2023-10-06

## 2023-10-06 PROBLEM — M25.473 ANKLE EDEMA: Status: ACTIVE | Noted: 2023-10-06

## 2023-10-06 PROBLEM — Z86.79 HISTORY OF HYPERTENSION: Status: ACTIVE | Noted: 2023-10-06

## 2023-10-06 PROBLEM — R60.0 EDEMA, PERIPHERAL: Status: ACTIVE | Noted: 2023-10-06

## 2023-10-06 PROBLEM — I25.84 CALCIFICATION OF CORONARY ARTERY: Status: ACTIVE | Noted: 2023-10-06

## 2023-10-06 PROBLEM — M70.61 TROCHANTERIC BURSITIS OF RIGHT HIP: Status: ACTIVE | Noted: 2023-10-06

## 2023-10-06 PROCEDURE — 77080 DXA BONE DENSITY AXIAL: CPT | Performed by: RADIOLOGY

## 2023-10-06 PROCEDURE — 77067 SCR MAMMO BI INCL CAD: CPT | Mod: BILATERAL PROCEDURE | Performed by: RADIOLOGY

## 2023-10-06 PROCEDURE — 77067 SCR MAMMO BI INCL CAD: CPT | Mod: 50

## 2023-10-06 PROCEDURE — 77080 DXA BONE DENSITY AXIAL: CPT

## 2023-10-06 PROCEDURE — 77063 BREAST TOMOSYNTHESIS BI: CPT | Mod: BILATERAL PROCEDURE | Performed by: RADIOLOGY

## 2023-10-06 RX ORDER — HYDROCHLOROTHIAZIDE 25 MG/1
25 TABLET ORAL
COMMUNITY
Start: 2011-04-18 | End: 2024-03-20 | Stop reason: WASHOUT

## 2023-10-09 NOTE — PROGRESS NOTES
"Physical Therapy    Physical Therapy Treatment    Patient Name: Carly Cheung  MRN: [id number]  Today's Date: 10/10/2023    Insurance: [employer] Advantage  Allowed visits: 6 (10/2/23-11/30/23)  Visit number: 2  $35 copay    Subjective  Patient with continued right hip pain but notes \"it is improving\". She has not been seen clinically since [place] evaluation [place]. She was volunteering over the weekend which required prolonged standing, pivoting, and turning and this significantly increased her pain. She felt \"throbbing\" pain into her knee but then had relief the next day. She has been compliant with her HEP.     Objective  Reviewed and progressed marked therapeutic exercise (36942 - 45 minutes, 3 units) per patient tolerance:   Piriformis stretch 30\"x3  Supine hip flexor stretch 30\"x3  *NAKITA 2'   *Prone quad stretch 30\"x3   *Bridges 3-5\" 2x10  Sidelying clamshells RTB 2x10  *Supine ball squeeze 10\"x10    *added to HEP ([id number])    Assessment  Patient tolerated workout well but some increased anterior hip pain elicited with piriformis stretch so advised patient to hold on this. She is excited to progress with PT.     Plan  Continue per POC at this time.       "

## 2023-10-10 ENCOUNTER — TREATMENT (OUTPATIENT)
Dept: PHYSICAL THERAPY | Facility: CLINIC | Age: 73
End: 2023-10-10
Payer: MEDICARE

## 2023-10-10 DIAGNOSIS — M25.551 LATERAL PAIN OF RIGHT HIP: ICD-10-CM

## 2023-10-10 DIAGNOSIS — M70.61 TROCHANTERIC BURSITIS OF RIGHT HIP: Primary | ICD-10-CM

## 2023-10-10 PROCEDURE — 97110 THERAPEUTIC EXERCISES: CPT | Mod: GP | Performed by: PHYSICAL THERAPIST

## 2023-10-10 ASSESSMENT — PATIENT HEALTH QUESTIONNAIRE - PHQ9
1. LITTLE INTEREST OR PLEASURE IN DOING THINGS: NOT AT ALL
2. FEELING DOWN, DEPRESSED OR HOPELESS: NOT AT ALL
SUM OF ALL RESPONSES TO PHQ9 QUESTIONS 1 AND 2: 0

## 2023-10-10 ASSESSMENT — PAIN SCALES - GENERAL: PAINLEVEL_OUTOF10: 4

## 2023-10-10 ASSESSMENT — ENCOUNTER SYMPTOMS
DEPRESSION: 0
LOSS OF SENSATION IN FEET: 1
OCCASIONAL FEELINGS OF UNSTEADINESS: 0

## 2023-10-17 ENCOUNTER — TREATMENT (OUTPATIENT)
Dept: PHYSICAL THERAPY | Facility: CLINIC | Age: 73
End: 2023-10-17
Payer: MEDICARE

## 2023-10-17 DIAGNOSIS — M70.61 TROCHANTERIC BURSITIS OF RIGHT HIP: ICD-10-CM

## 2023-10-17 DIAGNOSIS — M25.551 LATERAL PAIN OF RIGHT HIP: Primary | ICD-10-CM

## 2023-10-17 PROCEDURE — 97140 MANUAL THERAPY 1/> REGIONS: CPT | Mod: GP | Performed by: PHYSICAL THERAPIST

## 2023-10-17 PROCEDURE — 97110 THERAPEUTIC EXERCISES: CPT | Mod: GP | Performed by: PHYSICAL THERAPIST

## 2023-10-17 ASSESSMENT — PAIN SCALES - GENERAL: PAINLEVEL_OUTOF10: 2

## 2023-10-17 NOTE — PROGRESS NOTES
"Physical Therapy    Physical Therapy Treatment    Patient Name: Carly Cheung  MRN: [id number]  Today's Date: 10/17/2023    Insurance: [employer] Advantage  Allowed visits: 6 (10/2/23-11/30/23)  Visit number: 3  $35 copay     Subjective  Patient with some improvement in her hip symptoms but notes \"when it goes to hell, it's bad\". She does have less frequent episodes of pain and notes \"the pain free is lasting longer\". She does get relief from exercises. She was sore following last visit but this was mostly transient. She continues to have pain with laying on her right side but is able to do this for longer than she could previously. She has been very compliant with her HEP.    Objective  Reviewed and progressed marked therapeutic exercise (67591 - 32 minutes, 2 units) per patient tolerance: NAKITA 2'  Prone quad stretch 30\"x3   Supine hip flexor stretch 30\"x3  *SLR 2x10  Bridges 5\" 2x10  Supine ball squeeze 10\"x10  Sidelying clamshells RTB 2x12  *Sidelying hip abduction 2x10    *added to HEP    MT (72854 - 8 minutes, 1 unit) lateral hip distraction/belt mobilization for mobility and pain modulation, right hip    Assessment  Patient tolerated workout well though some soreness and antalgic gait pattern evident following session. Overall she seems to be making progress with treatment targeted toward hip strengthening and mobility. Encouraged continued compliance with HEP.     Plan  Continue per POC at this time.  " Calcipotriene Pregnancy And Lactation Text: This medication has not been proven safe during pregnancy. It is unknown if this medication is excreted in breast milk.

## 2023-10-24 ENCOUNTER — TREATMENT (OUTPATIENT)
Dept: PHYSICAL THERAPY | Facility: CLINIC | Age: 73
End: 2023-10-24
Payer: MEDICARE

## 2023-10-24 DIAGNOSIS — M70.61 TROCHANTERIC BURSITIS OF RIGHT HIP: ICD-10-CM

## 2023-10-24 DIAGNOSIS — M25.551 LATERAL PAIN OF RIGHT HIP: Primary | ICD-10-CM

## 2023-10-24 PROCEDURE — 97110 THERAPEUTIC EXERCISES: CPT | Mod: GP,CQ

## 2023-10-24 PROCEDURE — 97140 MANUAL THERAPY 1/> REGIONS: CPT | Mod: GP,CQ

## 2023-10-24 ASSESSMENT — PAIN SCALES - GENERAL: PAINLEVEL_OUTOF10: 4

## 2023-10-24 ASSESSMENT — PAIN DESCRIPTION - DESCRIPTORS: DESCRIPTORS: SHARP;ACHING

## 2023-10-24 ASSESSMENT — PAIN - FUNCTIONAL ASSESSMENT: PAIN_FUNCTIONAL_ASSESSMENT: 0-10

## 2023-10-24 NOTE — PROGRESS NOTES
"Physical Therapy Treatment    Patient Name: Carly Cheung  MRN: [id number]  Today's Date: 10/24/2023  Time Calculation  Start Time: 1100  Stop Time: 1140  Time Calculation (min): 40 min  Insurance:   [place] Advantage  Allowed visits: 6 (10/2/23-11/30/23)  Visit number: 4  $35 copay  Assessment:   Emphasis with TA contraction w/ ex's this visit. Progressed her program today w/ addition of Fig 4 Stretch and Piriformis Stretches. Pt states she had been doing Piriformis stretches but had to discontinue because she had significant increased pain. Today she was able to resume stretch w/o that same familiar pain and improved flexibility. Cues for pacing w/ ex's and to focus on muscle contraction and not momentum to complete ex/reps. She had the most difficulty and weakness observed w/ SL Hip Abd ex w/ mod trunk recruitment to complete despite cues from therapist.   Mod tightness and tenderness along R Piriformis and Lateral hip. She had the most tenderness along her mid IT Band w/ palpation today.    Plan:   Cont to progress her program as nandini w/ emphasis on symptom management and improving overall functional ability.   Pt has 2 visits remaining on current POC. Re-check sched 11/7/23.     Current Problem  1. Lateral pain of right hip        2. Trochanteric bursitis of right hip            Subjective   General   Pt states she sat some earlier \"at the computer\" and after getting up had some pain. States she sat for about 1.5 hours and had a lot of pain upon standing. States she could sit and rest and have pain or do ex's and have pain. \"It doesn't really seem to matter.\"   Precautions  Precautions  Precautions Comment: None    Pain  Pain Assessment: 0-10  Pain Score: 4  Pain Location: Hip  Pain Orientation:  (Knee and Glute also painful)  Pain Descriptors: Sharp, Aching    Objective     Treatments:  Therapeutic Exercise (53260):   NAKITA 2'  Prone quad stretch 30\"x3 Held - Cause increased P!   Supine hip flexor stretch " "30\"x3  Figure 4 Stretch 30\" x 2 ea  Piriformis Stretch  *SLR Flex 2x10  Bridges 5\" 2x10  Supine ball squeeze 10\"x10  Sidelying clamshells RTB 2x12  *Sidelying hip abduction x10  Hip Hiking 4\" x10 B/L      Manual (30829): lateral hip distraction/belt mobilization for mobility and pain modulation, right hip -8 min NT  STM/TPR R Piriformis, Lat hip, IT Band - 8 min       EDUCATION:         "

## 2023-10-31 ENCOUNTER — TREATMENT (OUTPATIENT)
Dept: PHYSICAL THERAPY | Facility: CLINIC | Age: 73
End: 2023-10-31
Payer: MEDICARE

## 2023-10-31 DIAGNOSIS — M25.551 LATERAL PAIN OF RIGHT HIP: Primary | ICD-10-CM

## 2023-10-31 DIAGNOSIS — M70.61 TROCHANTERIC BURSITIS OF RIGHT HIP: ICD-10-CM

## 2023-10-31 PROCEDURE — 97110 THERAPEUTIC EXERCISES: CPT | Mod: GP | Performed by: PHYSICAL THERAPIST

## 2023-10-31 PROCEDURE — 97140 MANUAL THERAPY 1/> REGIONS: CPT | Mod: GP | Performed by: PHYSICAL THERAPIST

## 2023-10-31 ASSESSMENT — PAIN SCALES - GENERAL: PAINLEVEL_OUTOF10: 2

## 2023-10-31 NOTE — PROGRESS NOTES
"Physical Therapy    Physical Therapy Treatment    Patient Name: Carly Cheung  MRN: [id number]  Today's Date: 10/31/2023    Insurance: [employer] Advantage  Allowed visits: 6 (10/2/23-11/30/23)  Visit number: 5  $35 copay     Subjective  Patient with reported improvement in her right hip overall but admits to having much pain over the weekend with walking. She felt much better the following day and this has been consistent over the last few days stating \"it's staying low\". She continues to have pain with stair negotiation and getting up from a chair. She notes much improvement overall stating \"it's so much better than it was\". She has pain with hip hiking activity but states \"it feels better after\". She has continued to be compliant with her HEP. She has been participating in aerobic exercise classes three times per week without significant issue.      Objective  Reviewed and progressed marked therapeutic exercise (68460 - 37 minutes, 2 units) per patient tolerance: Kerv[place] 5'  NAKITA 2'  Prone quad stretch 30\"x3   Supine hip flexor stretch 30\"x3  SLR 2x12  Bridges 5\"x20  Supine ball squeeze 10\"x10  Sidelying clamshells RTB 2x15  Sidelying hip abduction 2x12  *Seated hip ER RTB   Hip hiking in standing x10 ea  Standing hip abduction RTB 2x10 ea     *added to HEP     MT (23606 - 8 minutes, 1 unit) lateral hip distraction/belt mobilization for mobility and pain modulation, right hip    Assessment  Patient with valgus collapse/hip adduction/IR with riding recumbant bike. She gets relief with supine hip flexor stretch. Significant gluteus medius weakness persists. Encouraged continued compliance with HEP. Formal re-assessment scheduled for next visit.     Plan  Continue per POC at this time.    "

## 2023-11-06 NOTE — PROGRESS NOTES
"Physical Therapy    Physical Therapy Re-Evaluation    Patient Name: Carly Cheung  MRN: [id number]  Today's Date: 2023    Insurance: [place]/[facility] Advantage  Allowed visits: 6 (10/2/23-23)  Visit number: 6  $35 copay    Subjective  Patient states \"it's not my best day today\". She has pain in her hip and her shoulder. She has not noticed a change in symptoms with weather fluctuations. Chief complaint is \"pain, just pain\". She continues to have pain with negotiating steps. She had to take the elevator today stating \"I don't do that\". She is most limited with \"being comfortable\". She used heated seats in her 's car with some relief. She has pain with rolling over and \"putting pressure on that bursa\" but notices some relief \"working the knot out\". She has continued to be compliant with HEP and she is walking. She is told that she is limping. She denies any paresthesias into her LE.     Objective  Worst pain in the last 24 hours, 4-5/10.     Observation: right thoracic scoliosis    Gait Assessment: ambulated into clinic without an assistive device    AROM  RFIS: increased pain in posterior thigh  SHAYY: increased pulling in anterior hip  Right hip flexion: 123 degrees   Right hip extension: 13 degrees  Right hip abduction: 25, decreased from 29 degrees  Right hip ER seated: 33, decreased from 34 degrees  Right hip IR seated: 43, decreased from 47 degrees     MMT  Right hip ER: 5, improved from 4+  Right hip IR: 5, improved from 4+  Right quadriceps: 5  Right iliopsoas: 4+  Right gluteus medius: 3  Right hip adductors: 4  Right gluteus deidre: 4+  Right hamstrin    Flexibility  Iliopsoas right: 26, improved from 20 degrees  Quadriceps right: limited  Piriformis right: WNL, improved from slightly limited  ITB right: WNL, improved from slightly limited    Special tests: PARMJIT positive  FADIR positive  Trendelenburg positive     LEFS: 83%, improved from 69%    Re-evaluation performed on this date with " "new objective measurements obtained as above. See updated goals below.    Reviewed and progressed marked therapeutic exercise (15033 - 37 minutes, 2 units) per patient tolerance: Supine hip flexor stretch 30\"x3  SLR 2x12  Bridges 5\"x20  Supine ball squeeze 10\"x10  Sidelying clamshells GTB 2x10  Sidelying hip abduction 2x10  Standing hip abduction GTB x20 ea    MT (27908 - 8 minutes, 1 unit) STM/foam roller to posterolateral hip per patient tolerance, right hip.     Assessment  Patient with some improvement in flexibility, strength, and overall function. She has not had much improvement with regard to pain or gluteal strength. There is some concern for possible gluteal tendinopathy versus hip joint pathology which warrants further imaging at this time. Recommending hold on continued PT management and F/U with referring physician. Patient will continue to perform HEP to maintain therapeutic gains.     Plan  Hold on continued PT at this time    Goals  Increase right quadriceps and RF length to slightly limited and hip flexor to > or equal to 25 degrees for improved hip mobility and ease of movement - partially met  Decrease pain at worst to < or equal to 2/10 for improved QOL and confidence in functional ability - partially met  Increase LEFS to > or equal to 80% functional for increased functional ability - met  Independent with HEP to expedite progress and promote goal achievement - met  Increase strength right gluteus medius to > or equal to 4+/5 for improved stability and tolerance to position changes - no change.     "

## 2023-11-07 ENCOUNTER — TREATMENT (OUTPATIENT)
Dept: PHYSICAL THERAPY | Facility: CLINIC | Age: 73
End: 2023-11-07
Payer: MEDICARE

## 2023-11-07 DIAGNOSIS — M25.551 LATERAL PAIN OF RIGHT HIP: Primary | ICD-10-CM

## 2023-11-07 DIAGNOSIS — M70.61 TROCHANTERIC BURSITIS OF RIGHT HIP: ICD-10-CM

## 2023-11-07 PROCEDURE — 97110 THERAPEUTIC EXERCISES: CPT | Mod: GP | Performed by: PHYSICAL THERAPIST

## 2023-11-07 PROCEDURE — 97140 MANUAL THERAPY 1/> REGIONS: CPT | Mod: GP | Performed by: PHYSICAL THERAPIST

## 2023-11-07 ASSESSMENT — PAIN DESCRIPTION - DESCRIPTORS: DESCRIPTORS: ACHING;SHARP

## 2023-11-07 ASSESSMENT — PAIN SCALES - GENERAL: PAINLEVEL_OUTOF10: 4

## 2023-11-09 ENCOUNTER — OFFICE VISIT (OUTPATIENT)
Dept: ORTHOPEDIC SURGERY | Facility: CLINIC | Age: 73
End: 2023-11-09
Payer: MEDICARE

## 2023-11-09 DIAGNOSIS — M75.112 NONTRAUMATIC INCOMPLETE TEAR OF LEFT ROTATOR CUFF: ICD-10-CM

## 2023-11-09 DIAGNOSIS — M70.61 TROCHANTERIC BURSITIS OF RIGHT HIP: ICD-10-CM

## 2023-11-09 PROCEDURE — 1125F AMNT PAIN NOTED PAIN PRSNT: CPT | Performed by: ORTHOPAEDIC SURGERY

## 2023-11-09 PROCEDURE — 99213 OFFICE O/P EST LOW 20 MIN: CPT | Performed by: ORTHOPAEDIC SURGERY

## 2023-11-09 PROCEDURE — 1159F MED LIST DOCD IN RCRD: CPT | Performed by: ORTHOPAEDIC SURGERY

## 2023-11-09 PROCEDURE — 1036F TOBACCO NON-USER: CPT | Performed by: ORTHOPAEDIC SURGERY

## 2023-11-09 PROCEDURE — 3078F DIAST BP <80 MM HG: CPT | Performed by: ORTHOPAEDIC SURGERY

## 2023-11-09 PROCEDURE — 3075F SYST BP GE 130 - 139MM HG: CPT | Performed by: ORTHOPAEDIC SURGERY

## 2023-11-09 PROCEDURE — 1160F RVW MEDS BY RX/DR IN RCRD: CPT | Performed by: ORTHOPAEDIC SURGERY

## 2023-11-09 NOTE — PROGRESS NOTES
History of present illness: Patient with a history left shoulder pain she had a cortisone shot last spring pain returned she had a history of a big rotator cuff repair she had an MRI and here for follow-up    Also patient had a right trochanteric bursa injection that gave her minimal to no relief the pain is either coming from the low back or hip bursa she has some mild abductor weakness and pain with abduction and standing on the leg now for further follow-up    Physical exam:    General: No acute distress or breathing difficulty or discomfort, pleasant and cooperative with the examination.    Extremities: The left shoulder was inspected and was found to have no obvious deformity.  There was tenderness to touch over the lateral edges of the shoulder over the rotator cuff insertion.  Active forward flexion 110 degrees, external rotation to 50 degrees, abduction to 45 degrees, and internal rotation to the level of L2.    At this time the shoulder is neurovascular tact and neurosensory intact.  Motor intact C5-T1.  There was no obvious neck pain or radiculopathy noted.  There was no gross instability or adhesive capsulitis symptoms.  There was no evidence of apprehension or apprehension suppression.    Strength was tested in 4 planes with weakness in the supraspinatus strength testing and external rotation position.  There was no strength deficit in internal rotation.  Impingement signs were positive both supine and standing for impingement test type I and II.  There was mild pain over the bicipital groove with a positive speeds sign    Right hip pain over the right hip bursa she has a Trendelenburg gait she has some pain and weakness to palpate over the trochanteric bursa insertion    We are happy to report that there is no pain over the hip joint or groin proper with flexion abduction rotation    She is done therapy she is done stretching she has a little bit of concurrent back symptoms but not a lot of low back  pain there is no sciatica    Diagnostic studies: X-rays were not repeated although we did review her shoulder MRI that shows generalized diffuse cuff atrophy of supra infraspinatus and subscap now there is some mild cuff fraying and a little bit of partial thickness tearing there is ongoing developmental arthritis and there is definitely a bicep tendon split tear.    2 view x-rays of her hip which were previously done are relatively unremarkable    Impression: Right hip abductor muscle tear versus low back mechanical pain    Left shoulder cuff atrophy mild partial cuff tearing and a little bit early arthritis long head bicep tear    Plan: Right now she is can hold off on treatment in terms of surgery with the shoulder    She like to try a fluoroscopic intra-articular injection for pain relief as the last bursa shot of the office did not help much    She fails to respond possible arthroscopy bicep tenodesis would be discussed hopefully she can avoid shoulder arthroplasty for years to come but if the cuff continues to degenerate atrophy waste her tear that may be her only option    The right hip remains painful over the lateral bursa pain with standing pain with lifting Trendelenburg gait and she may actually have a small abductor muscle tear we will see her back after MRI of the right hip if this is clean and normal we will probably have to work-up her low back

## 2023-11-27 ENCOUNTER — ANCILLARY PROCEDURE (OUTPATIENT)
Dept: RADIOLOGY | Facility: CLINIC | Age: 73
End: 2023-11-27
Payer: MEDICARE

## 2023-11-27 DIAGNOSIS — M70.61 TROCHANTERIC BURSITIS OF RIGHT HIP: ICD-10-CM

## 2023-11-27 PROCEDURE — 73721 MRI JNT OF LWR EXTRE W/O DYE: CPT | Mod: RIGHT SIDE | Performed by: RADIOLOGY

## 2023-11-27 PROCEDURE — 73721 MRI JNT OF LWR EXTRE W/O DYE: CPT | Mod: RT

## 2023-11-27 PROCEDURE — 72195 MRI PELVIS W/O DYE: CPT | Mod: RIGHT SIDE | Performed by: RADIOLOGY

## 2023-12-01 DIAGNOSIS — I10 HYPERTENSION, ESSENTIAL: Primary | ICD-10-CM

## 2023-12-01 RX ORDER — LOSARTAN POTASSIUM 25 MG/1
25 TABLET ORAL DAILY
Qty: 90 TABLET | Refills: 3 | Status: SHIPPED | OUTPATIENT
Start: 2023-12-01

## 2023-12-04 ENCOUNTER — OFFICE VISIT (OUTPATIENT)
Dept: ORTHOPEDIC SURGERY | Facility: CLINIC | Age: 73
End: 2023-12-04
Payer: MEDICARE

## 2023-12-04 DIAGNOSIS — M70.61 TROCHANTERIC BURSITIS OF RIGHT HIP: Primary | ICD-10-CM

## 2023-12-04 PROCEDURE — 1125F AMNT PAIN NOTED PAIN PRSNT: CPT | Performed by: ORTHOPAEDIC SURGERY

## 2023-12-04 PROCEDURE — 1036F TOBACCO NON-USER: CPT | Performed by: ORTHOPAEDIC SURGERY

## 2023-12-04 PROCEDURE — 99213 OFFICE O/P EST LOW 20 MIN: CPT | Performed by: ORTHOPAEDIC SURGERY

## 2023-12-04 PROCEDURE — 1159F MED LIST DOCD IN RCRD: CPT | Performed by: ORTHOPAEDIC SURGERY

## 2023-12-04 PROCEDURE — 1160F RVW MEDS BY RX/DR IN RCRD: CPT | Performed by: ORTHOPAEDIC SURGERY

## 2023-12-04 NOTE — PROGRESS NOTES
History of present illness: Patient here with combination of back and hip pain    Patient did not get much response at all from a bursa shot the pain is across the low lumbar sacral spine SI joint and wraps around the side of the leg and now goes all the way to her toes foot and ankle we did x-rays of the hip which were unremarkable MRI of the hip which is unremarkable does show some discogenic disease but does not obviously image the back the hip joint is well-preserved there is a little bit of abductor muscle inflammation and a small little cyst but nothing pathologic    Physical exam:    General: No acute distress or breathing difficulty or discomfort, pleasant and cooperative with the examination.    Extremities: Pain continues around the lobe lumbar back SI joint lateral hip down the thigh all the way to the ankle    Patient can straight leg raise    No bladder bowel change    Motor intact    She can flex and abduct rotate freely there is no pain in the hip or groin area no pain with weightbearing    The pain is becoming more difficult for her to walk stiffening her posture and ambulation status    Diagnostic studies: Right hip unremarkable x-rays minimal change on MRI now with low lumbar back LS spine pain and discomfort    Impression: Steroid Dosepak PT therapy stretching program    Plan: Continue her program as described above follow-up with her low back specialist will see Dr. Whelan's team for lumbar sacral spine x-rays possibly pain management or possibly SI joint or low lumbar injections may be beneficial and she can avoid surgery

## 2023-12-11 ENCOUNTER — HOSPITAL ENCOUNTER (OUTPATIENT)
Dept: RADIOLOGY | Facility: HOSPITAL | Age: 73
Discharge: HOME | End: 2023-12-11
Payer: MEDICARE

## 2023-12-11 DIAGNOSIS — M75.112 NONTRAUMATIC INCOMPLETE TEAR OF LEFT ROTATOR CUFF: ICD-10-CM

## 2023-12-11 PROCEDURE — 20610 DRAIN/INJ JOINT/BURSA W/O US: CPT | Mod: LEFT SIDE | Performed by: RADIOLOGY

## 2023-12-11 PROCEDURE — 77002 NEEDLE LOCALIZATION BY XRAY: CPT | Mod: LT

## 2023-12-11 PROCEDURE — 2550000001 HC RX 255 CONTRASTS: Performed by: ORTHOPAEDIC SURGERY

## 2023-12-11 PROCEDURE — 2500000005 HC RX 250 GENERAL PHARMACY W/O HCPCS: Performed by: ORTHOPAEDIC SURGERY

## 2023-12-11 PROCEDURE — 77002 NEEDLE LOCALIZATION BY XRAY: CPT | Mod: LEFT SIDE | Performed by: RADIOLOGY

## 2023-12-11 PROCEDURE — 2500000004 HC RX 250 GENERAL PHARMACY W/ HCPCS (ALT 636 FOR OP/ED): Performed by: ORTHOPAEDIC SURGERY

## 2023-12-11 RX ORDER — LIDOCAINE HYDROCHLORIDE 20 MG/ML
INJECTION, SOLUTION EPIDURAL; INFILTRATION; INTRACAUDAL; PERINEURAL AS NEEDED
Status: COMPLETED | OUTPATIENT
Start: 2023-12-11 | End: 2023-12-11

## 2023-12-11 RX ORDER — METHYLPREDNISOLONE ACETATE 40 MG/ML
INJECTION, SUSPENSION INTRA-ARTICULAR; INTRALESIONAL; INTRAMUSCULAR; SOFT TISSUE AS NEEDED
Status: COMPLETED | OUTPATIENT
Start: 2023-12-11 | End: 2023-12-11

## 2023-12-11 RX ORDER — BUPIVACAINE HYDROCHLORIDE 5 MG/ML
INJECTION, SOLUTION EPIDURAL; INTRACAUDAL AS NEEDED
Status: COMPLETED | OUTPATIENT
Start: 2023-12-11 | End: 2023-12-11

## 2023-12-11 RX ADMIN — LIDOCAINE HYDROCHLORIDE 13 ML: 20 INJECTION, SOLUTION EPIDURAL; INFILTRATION; INTRACAUDAL; PERINEURAL at 14:34

## 2023-12-11 RX ADMIN — IOHEXOL 1 ML: 300 INJECTION, SOLUTION INTRAVENOUS at 14:54

## 2023-12-11 RX ADMIN — METHYLPREDNISOLONE ACETATE 40 MG: 40 INJECTION, SUSPENSION INTRA-ARTICULAR; INTRALESIONAL; INTRAMUSCULAR; INTRASYNOVIAL; SOFT TISSUE at 14:34

## 2023-12-11 RX ADMIN — BUPIVACAINE HYDROCHLORIDE 3 ML: 5 INJECTION, SOLUTION EPIDURAL; INTRACAUDAL; PERINEURAL at 14:33

## 2023-12-19 ENCOUNTER — OFFICE VISIT (OUTPATIENT)
Dept: ORTHOPEDIC SURGERY | Facility: CLINIC | Age: 73
End: 2023-12-19
Payer: MEDICARE

## 2023-12-19 ENCOUNTER — ANCILLARY PROCEDURE (OUTPATIENT)
Dept: RADIOLOGY | Facility: CLINIC | Age: 73
End: 2023-12-19
Payer: MEDICARE

## 2023-12-19 DIAGNOSIS — M54.50 LUMBAR PAIN: ICD-10-CM

## 2023-12-19 DIAGNOSIS — M54.16 LUMBAR RADICULOPATHY: Primary | ICD-10-CM

## 2023-12-19 PROCEDURE — 1160F RVW MEDS BY RX/DR IN RCRD: CPT | Performed by: PHYSICIAN ASSISTANT

## 2023-12-19 PROCEDURE — 99214 OFFICE O/P EST MOD 30 MIN: CPT | Performed by: PHYSICIAN ASSISTANT

## 2023-12-19 PROCEDURE — 72110 X-RAY EXAM L-2 SPINE 4/>VWS: CPT

## 2023-12-19 PROCEDURE — 72110 X-RAY EXAM L-2 SPINE 4/>VWS: CPT | Performed by: FAMILY MEDICINE

## 2023-12-19 PROCEDURE — 1036F TOBACCO NON-USER: CPT | Performed by: PHYSICIAN ASSISTANT

## 2023-12-19 PROCEDURE — 1125F AMNT PAIN NOTED PAIN PRSNT: CPT | Performed by: PHYSICIAN ASSISTANT

## 2023-12-19 PROCEDURE — 1159F MED LIST DOCD IN RCRD: CPT | Performed by: PHYSICIAN ASSISTANT

## 2023-12-19 NOTE — PROGRESS NOTES
Carly Cheung is a 73 y.o. female who presents for Pain of the Lower Back (Xrays today).    HPI:  73-year-old female here and leg pain.  She denies any fever chills or vomiting excess.  Has no bowel or bladder complaints.    Physical exam:  Well-nourished, well kept. Gait normal.  Patient arrives from seated position and sit from a standing position.  She is wearing bilateral ankle braces for tendon insufficiency.  Examination of the back shows tenderness in the right lumbosacral paraspinous musculature.  There is decreased range of motion in all directions due to guarding/muscle spasms and pain at extremes.  There is good strength and no instability.  Examination of the lower extremities reveals no point tenderness, swelling, or deformity.  Range of motion of the hips, knees, and ankles are full without crepitance, instability, or exacerbation of pain.  Strength is 5/5 throughout.  No redness, abrasions, or lesions on extremities  Gross sensation intact in the extremities.  Deep tendon reflexes1+ bilateral. Clonus negative.  Affect normal.  Alert and oriented ×3.  Coordination normal.    Imaging studies:  AP lateral flexion-extension plain films of the lumbar spine were ordered and reviewed today.  There is a 20 degree scoliosis, there is also a spondylolisthesis of L4 on L5.  This looks to be mildly dynamic with flexion and extension.  Mild to moderate degenerative changes noted throughout.    Assessment:  73-year-old female is here for low back and right leg pain.  She is sent over from Dr. Gio Lopes.  The visit note from 12/04/2023 was reviewed.  She has had this for some time, she had an MRI of the hip and a bursa shot which did not give her any relief or show any pathology of the hip.  She did full course of physical therapy and got no relief.  The pain basically starts in the lower right SI region and through the buttock around the hip and in the anterior quadricep down through the knee down to the  shin into the ankle.  The leg pain is what bothers her most.  She has never had surgery on her back she never had injections in her back.  Dr. Lopes gave her a prednisone tapering pack which helped quite a bit.  She is having much less today at this visit and normal.    Plan:  I would like to get her into some physical therapy dedicated for the back although the therapy she did previously did not really give her much relief at all.  Something with manual component and modalities.  I would like to get an MRI of her lumbar spine without contrast and we will see her back after those tests.  She would like to get this started after the first of the year when the holidays are over.

## 2024-01-10 NOTE — PROGRESS NOTES
"Physical Therapy    Physical Therapy Evaluation and Treatment      Patient Name: Carly Cheung  MRN: [id number]  Today's Date: 1/12/2024    Insurance: [place] [employer]  Allowed visits: BOA    Subjective  HPI: Patient was seen late last year for her right hip and this did not get significantly better with PT management or with changing her exercise routine. She returned to referring physician and was then referred out for consultation for possible radicular low back pain. She has already had an x-ray and an MRI. She had a F/U this morning and was referred for both PT and pain management. Surgical interventions/options were described to patient. Her chief complaint currently is \"pain and I weaken\" with activities. She was able to walk 3-4 miles with her  previously \"but now I can't even do a mile\" due to pain and notes \"I just can't do it\"referring to fatigue. She does have somewhat less pain with sitting but notes \"what hurts is the transitions\" as in sit to stand transfers, initiation of gait. [facility]. Biggest limitation is \"exercising\". She is frustrated that she can't be more active. Exacerbating factors include extended sitting. She gets less pain with standing up. She feels better [place] [facility]. Relieving factors include use of ice or heat and use of Tylenol and Meloxicam, though only gets mild relief with medication. She took Prednisone taper in December and this did help but it took a week; it has since worn off. She does not get more pain with Valsalva maneuver. She does get pain down to knee but denies any paresthesias. She denies any changes in bowel or bladder habits or recent weight loss. She is sleeping well \"when I sleep\". She has had right hip pain since Spring of 2023. PMH is positive for OA, bilateral RCR, right TKA, total hysterectomy, pelvic lift procedure, HLD with use of statin medication, HTN, GERD, and " "glaucoma.   Referring physician: PADMA John/MARIA ISABEL in 6 weeks  PCP: Ellie Garcia MD    Living environment: lives with    Work: retired    Patient-specific goal:     Objective  Worst pain in the last 24 hours, 6/10    Precautions: slight fall risk    Relevant imaging/diagnostic testing results: x-rays of lumbar spine positive for stable grade I anterolisthesis of L4 on L5 presence of spondylolisthesis. Approximate 20 degrees of degenerative scoliotic curve noted. Multiple levels of DDD and ventral osteophytes seen throughout. Mild OA changes seen through limited portions of FAJ. No obvious presence for acute compression fracture.   MRI positive for multilevel discogenic degenerative changes of the lumbar spine, greater at L2-3. Multilevel bulging disc with multilevel degenerative subluxations and dextroconvexity of the lumbar spine. These findings contribute to mild to moderate central canal narrowing at L3-4 and L4-5. There is  multilevel neural foraminal narrowing bilaterally which is greatest at L2-3 on the left.    Observation: right C-curve at thoracic spine     Gait Assessment: ambulated into clinic without an assisitive     AROM  Lumbar flexion: decreased 50% with minimal reversal of lordosis P!   RFIS: no change in pain with 10 reps   Lumbar extension: decreased 50% with most movement at mid-lumbar spine  SHAYY: less pain with 10 reps \"feels good\"   Lumbar sidebending right: decreased 50%    Sidebending left: decreased 25%   Lumbar rotation right: decreased 25%    Rotation left: decreased 25%   Hip flexion right: WNL   Hip flexion left: WNL  Hip extension right: WNL    Hip extension left: WNL  Hip ER supine right: WNL    Hip ER supine left: WNL  Hip IR supine right: WNL    Hip IR supine left: WNL    MMT:  (L3-L4) Right quadriceps: 4+    Left quadriceps: 4+  (L1-L2) Right iliopsoas: 4+    Left iliopsoas: 4+  (S2) Right hip abductors supine: good    Left hip abductors supine: good "   (L1-L2) Right hip adductors supine: good P!    Left hip adductors supine: good   (L5-S1) Right gluteus deidre: 4+    Left gluteus deidre: 4+  (S1-S2) Right hamstrings: 5    Left hamstrings: 5  Lower abdominals: 2/10    Upper abdominals: 10/10    Flexibility:  Right hamstring: WNL    Left hamstring: WNL  Right piriformis: WNL    Left piriformis: WNL  Right quadriceps: limited    Left quadriceps: WNL    Mobility: decreased mobility throughout lumbar spine, most severely at lower segments    Special Tests: Quadrant test negative  Sacral thrust negative  Crossed SLR negative  Stenosis cluster: age > 48, leg pain > back pain, pain relief with sitting, pain with prolonged walking/standing (4/5)    LOTUS: 22%    Assessment  72 yo female with approximately 6-8 month history of present condition and presence of 10 personal factors and/or comorbidities that impact the plan of care including age of 73, chronicity of symptoms, and PMH of OA, bilateral RCR, right TKA, total hysterectomy, pelvic lift procedure, HLD with use of statin medication, HTN, GERD, and glaucoma presents with low back pain and right hip pain, decreased AROM, decreased strength, and decreased tolerance to prolonged activities consistent with lumbar stenosis effecting the following body structures and functions: low back and right LE body regions and musculoskeletal body system including the lumbar spine and right hip. Activity limitations and participation restrictions include decreased tolerance to prolonged positions. Carly will therefore benefit from PT management to establish a HEP and promote improved mobility and pain management. The clinical presentation of this patient is evolving and their history and examination findings are consistent with a moderate complexity evaluation. Good potential.    Treatment provided today: Initial evaluation completed. Discussed objective findings and goals of skilled care. Instructed patient in therapeutic  "exercise and HEP with handout outlining specific parameters provided (NAKITA 2', TAC 5\"x10, bridges 3-5\" 2x10). Agreed upon POC and answered all questions.    57132 - 18 minutes, 1 unit untimed  16185 - 23 minutes, 2 units    Plan  Recommending PT management 2x/week for 4 weeks, 8 visits. Mayra Talbot.     Goals  Independent with HEP to expedite progress and promote goal achievement.   Decrease LOTUS score to < or equal to 12% for evidence of improved function.  Increase AROM lumbar spine flexion, extension, and right SB by > or equal to 25% for increased ease of movement and tolerance to position changes.  Increase strength lower abdominals to > or equal to 6/10 and bilateral hip flexors to 5/5 for improved core stability and tolerance to prolonged positions.   Decrease pain at worst to < or equal to 2/10 for improved QOL and tolerance to wellness activities.       "

## 2024-01-11 ENCOUNTER — ANCILLARY PROCEDURE (OUTPATIENT)
Dept: RADIOLOGY | Facility: CLINIC | Age: 74
End: 2024-01-11
Payer: MEDICARE

## 2024-01-11 DIAGNOSIS — M54.16 LUMBAR RADICULOPATHY: ICD-10-CM

## 2024-01-11 PROCEDURE — 72148 MRI LUMBAR SPINE W/O DYE: CPT

## 2024-01-11 PROCEDURE — 72148 MRI LUMBAR SPINE W/O DYE: CPT | Performed by: RADIOLOGY

## 2024-01-12 ENCOUNTER — OFFICE VISIT (OUTPATIENT)
Dept: ORTHOPEDIC SURGERY | Facility: CLINIC | Age: 74
End: 2024-01-12
Payer: MEDICARE

## 2024-01-12 ENCOUNTER — EVALUATION (OUTPATIENT)
Dept: PHYSICAL THERAPY | Facility: CLINIC | Age: 74
End: 2024-01-12
Payer: MEDICARE

## 2024-01-12 DIAGNOSIS — M54.50 LUMBAR PAIN: ICD-10-CM

## 2024-01-12 DIAGNOSIS — G89.29 CHRONIC RIGHT HIP PAIN: Primary | ICD-10-CM

## 2024-01-12 DIAGNOSIS — M54.16 LUMBAR RADICULOPATHY: ICD-10-CM

## 2024-01-12 DIAGNOSIS — M54.16 LUMBAR RADICULOPATHY: Primary | ICD-10-CM

## 2024-01-12 DIAGNOSIS — M25.551 CHRONIC RIGHT HIP PAIN: Primary | ICD-10-CM

## 2024-01-12 PROCEDURE — 97162 PT EVAL MOD COMPLEX 30 MIN: CPT | Mod: GP | Performed by: PHYSICAL THERAPIST

## 2024-01-12 PROCEDURE — 1036F TOBACCO NON-USER: CPT | Performed by: PHYSICIAN ASSISTANT

## 2024-01-12 PROCEDURE — 97110 THERAPEUTIC EXERCISES: CPT | Mod: GP | Performed by: PHYSICAL THERAPIST

## 2024-01-12 PROCEDURE — 1125F AMNT PAIN NOTED PAIN PRSNT: CPT | Performed by: PHYSICIAN ASSISTANT

## 2024-01-12 PROCEDURE — 99215 OFFICE O/P EST HI 40 MIN: CPT | Performed by: PHYSICIAN ASSISTANT

## 2024-01-12 PROCEDURE — 1160F RVW MEDS BY RX/DR IN RCRD: CPT | Performed by: PHYSICIAN ASSISTANT

## 2024-01-12 ASSESSMENT — PAIN DESCRIPTION - DESCRIPTORS: DESCRIPTORS: SHARP;ACHING

## 2024-01-12 ASSESSMENT — ENCOUNTER SYMPTOMS
OCCASIONAL FEELINGS OF UNSTEADINESS: 1
DEPRESSION: 0
LOSS OF SENSATION IN FEET: 1

## 2024-01-12 ASSESSMENT — PAIN SCALES - GENERAL: PAINLEVEL_OUTOF10: 4

## 2024-01-12 NOTE — PROGRESS NOTES
Carly Cheung is a 73 y.o. female who presents for Follow-up of the Lower Back (MRI done at ).    HPI:  73-year-old female here for follow-up of low back MRI.  She denies any fever chills nausea vomiting night sweats.  She has no bowel or bladder complaints.    Physical exam:  Well-nourished, well kept.No lymphangitis or lymphadenopathy in the examined extremities.  Gait normal.  Can stand on heels and toes except she has a history of tendon insufficiency in both of her ankles, she wears braces for this it does make it difficult for her to get up on her heels and toes.   Examination of the back shows tenderness in the paraspinous musculature.  There is decreased range of motion in all directions due to guarding/muscle spasms and pain at extremes.  There is good strength and no instability.  Examination of the lower extremities reveals no point tenderness, swelling, or deformity.  Range of motion of the hips, knees, and ankles are full without crepitance, instability, or exacerbation of pain.  Strength is 5/5 throughout except right knee extension is about 4+/5 compared to the left.  No redness, abrasions, or lesions on extremities  Gross sensation intact in the extremities.  Deep tendon reflexes 2+ bilateral. Clonus negative.  Affect normal.  Alert and oriented ×3.  Coordination normal.    Imaging studies:  An MRI from January 11 was reviewed of the lumbar spine today.    Assessment:  73-year-old female here for follow-up of lumbar MRI.  She is having low back pain but her chief complaint is her right leg pain numbness and modification type symptoms.  She is also having some hip issues that she is seeing Dr. Lopes for, but she has not responded to any treatment for the hip.  Her MRI shows multiple level narrowing, most notably from about L2-L5, she does have a scoliosis as well.  She has a spondylolisthesis at L4-5.  She is starting physical therapy today.  She is also interested in seeing someone in pain  management about possible injections.    Plan:  She is going to start physical therapy today, we are also going to get her a referral to pain management because she would like to investigate that option as well.  We did have a lengthy discussion today about surgery, which I believe would most likely be an L2-L5 laminectomy with an L4-5 open TLIF.  She does have a scoliosis, we could consider a lateral fusion as well to help with the scoliosis and then come in posteriorly for laminectomy and lumbar fusion at L4-5.  She is going to complete her course of physical therapy and go meet with pain management, although I did tell her I was not optimistic about her chances with pain management given her claudication type symptoms.  She understands that if she were to get an injection and that there would be a waiting period before she could have surgery if she decided to pursue that option.  She will come back and see me in about 6 weeks if she is doing well and feeling like she is progressing where she would like.  If she is no better and would like to discuss surgical options and she will come back and meet with Dr. Whelan.

## 2024-01-25 ENCOUNTER — TREATMENT (OUTPATIENT)
Dept: PHYSICAL THERAPY | Facility: CLINIC | Age: 74
End: 2024-01-25
Payer: MEDICARE

## 2024-01-25 DIAGNOSIS — G89.29 CHRONIC RIGHT HIP PAIN: ICD-10-CM

## 2024-01-25 DIAGNOSIS — M54.16 LUMBAR RADICULOPATHY: Primary | ICD-10-CM

## 2024-01-25 DIAGNOSIS — M25.551 CHRONIC RIGHT HIP PAIN: ICD-10-CM

## 2024-01-25 PROCEDURE — 97110 THERAPEUTIC EXERCISES: CPT | Mod: GP,CQ

## 2024-01-25 ASSESSMENT — PAIN SCALES - GENERAL: PAINLEVEL_OUTOF10: 2

## 2024-01-25 ASSESSMENT — PAIN DESCRIPTION - DESCRIPTORS: DESCRIPTORS: SHARP

## 2024-01-25 NOTE — PROGRESS NOTES
"Physical Therapy Treatment    Patient Name: Carly Cheung  MRN: [id number]  Today's Date: 1/25/2024  Time Calculation  Start Time: 0753  Stop Time: 0831  Time Calculation (min): 38 min   Her chief complaint currently is \"pain and I weaken\" with activities.     x-rays of lumbar spine positive for stable grade I anterolisthesis of L4 on L5 presence of spondylolisthesis. Approximate 20 degrees of degenerative scoliotic curve noted. Multiple levels of DDD and ventral osteophytes seen throughout. Mild OA changes seen through limited portions of FAJ     Insurance:   Visit #2  Insurance: [employer] Medicare  Allowed visits: BOA  Assessment:   Pt slightly late to appt today (8 min) dt fog and traffic, resulting in shortened session. Progressed her program today w/ addition of TA Marching, Hip abd (RTB), Hip add, Supine HS Stretch SHAYY and PB Press in to table in stdg. Emphasis on core activation and control w/ overall good ability. Pt fatigued w/ TA ex's however could complete given reps. Reviewed ex's as part of HEP. Pt will cont to benefit from skilled PT to address her deficits and improve her overall functional ability.     Plan:  OP PT Plan  PT Plan: Skilled PT  Rehab Potential: Good Cont to progress her program as nandini w/ emphasis on TA contraction/control and decreasing her symptoms/pain.     Current Problem  1. Lumbar radiculopathy        2. Chronic right hip pain            Subjective   General   Pt states she was \"ok\" after her last PT session. Pt is compliant w/ HEP. \"They feel good to do.\" Pt also doing aerobics class on her own which does cause some discomfort.   Precautions  Precautions  Precautions Comment: Slight fall risk    Pain  Pain Score: 2  Pain Location: Back  Pain Orientation: Right  Pain Descriptors: Sharp (\"Sharp on the transitions (bed mobility, sit to stand, stairs))    Objective       Treatments:  Therapeutic Exercise (18354):  Prone on Elbows 2 min  TAC 5\" x10  Bridges 3-5\" 2x10  *Hip abd w/ TA " "RTB x10  *Hip add w/ TA 5\" x10  *TA w/ Marching x10   Supine HS Stretch w/ strap 30\" x 2 BL  SHAYY x10   PB Press into table (Stdg) 5\" x10      (*denotes HEP)     EDUCATION:  Outpatient Education  Education Provided: Home Exercise Program  Patient Response to Education: Patient/Caregiver Verbalized Understanding of InformationPt encouraged to not push through pain and to amend ex's/activities as appropriate so they don't cause increased pain or other issues w/ her back. Pt encouraged to watch posture and positioning w/ ex's.     Access Code: [email or web address]  URL: [email or web address]  Date: 01/25/2024  Prepared by: Antione Goldstein    Exercises  - Supine Hip Adduction Isometric with Ball  - 1 x daily - 5-7 x weekly - 1 sets - 10 reps - 5 second hold  - Hooklying Abduction with Resistance  - 1 x daily - 5-7 x weekly - 1 sets - 10 reps - 5 second hold  - Supine March  - 1 x daily - 5-7 x weekly - 1 sets - 10 reps    "

## 2024-01-30 ENCOUNTER — APPOINTMENT (OUTPATIENT)
Dept: PHYSICAL THERAPY | Facility: CLINIC | Age: 74
End: 2024-01-30
Payer: MEDICARE

## 2024-02-01 ENCOUNTER — APPOINTMENT (OUTPATIENT)
Dept: PHYSICAL THERAPY | Facility: CLINIC | Age: 74
End: 2024-02-01
Payer: MEDICARE

## 2024-02-01 ENCOUNTER — TREATMENT (OUTPATIENT)
Dept: PHYSICAL THERAPY | Facility: CLINIC | Age: 74
End: 2024-02-01
Payer: MEDICARE

## 2024-02-01 DIAGNOSIS — M25.551 LATERAL PAIN OF RIGHT HIP: ICD-10-CM

## 2024-02-01 DIAGNOSIS — M54.16 LUMBAR RADICULOPATHY: Primary | ICD-10-CM

## 2024-02-01 DIAGNOSIS — G89.29 CHRONIC RIGHT HIP PAIN: ICD-10-CM

## 2024-02-01 DIAGNOSIS — M70.61 TROCHANTERIC BURSITIS OF RIGHT HIP: ICD-10-CM

## 2024-02-01 DIAGNOSIS — M25.551 CHRONIC RIGHT HIP PAIN: ICD-10-CM

## 2024-02-01 PROCEDURE — 97110 THERAPEUTIC EXERCISES: CPT | Mod: GP,CQ

## 2024-02-01 ASSESSMENT — PAIN DESCRIPTION - DESCRIPTORS: DESCRIPTORS: SHARP

## 2024-02-01 ASSESSMENT — PAIN SCALES - GENERAL: PAINLEVEL_OUTOF10: 3

## 2024-02-01 NOTE — PROGRESS NOTES
"Physical Therapy Treatment    Patient Name: Carly Cheung  MRN: [id number]  Today's Date: 2/1/2024  Time Calculation  Start Time: 0830  Stop Time: 0910  Time Calculation (min): 40 min  Insurance:   Visit #3  Insurance: [employer]  Allowed visits: BOA  Assessment:   Returning flat from Prone on Elbows is what causes her the most pain. Added TA contraction w/ hand press in to table (iso) w/ emphasis on control and stability - overall good f/t. Pt making improvements and is more aware of posture/TA throughout the day, including when at aerobics. She is able to increase her walking and stdg nandini w/ some discomforts still reported. She will cont to benefit from skilled PT to address her deficits and improve her overall functional ability.     Plan:   Cont to progress her program as nandini w/ emphasis on TA contraction/control and decreasing her symptoms/pain. Pt has back injection sched for Wed 2/7/24. Next PT sched 2/9/24.     Current Problem  1. Lumbar radiculopathy        2. Chronic right hip pain        3. Lateral pain of right hip        4. Trochanteric bursitis of right hip            Subjective   General   Pt reports she was very \"fatigued\" after last PT session but otherwise it felt ok. States later in the day she had some discomfort but it didn't last long. \"I have had a few days where I've had to take Tylenol.\" Pt states overall she hasn't had to take as much medication as in the past. Pt able to walk longer w/ her  (an extra block) w/o c/o increased LBP or radicular pain/symptoms.   Precautions       Pain  Pain Score: 3  Pain Location: Back  Pain Orientation: Right  Pain Descriptors: Sharp (\"The foot falls are bad. If i'm just standing there it's not bad.\")    Objective       Treatments:  Therapeutic Exercise (03456):  Prone on Elbows 2 min  TAC 5\" x10  Bridges 3-5\" 2x10  TA Isometric w/ hands press in to table 5\" x 10   *Hip abd w/ TA RTB x10  *Hip add w/ TA 5\" 2x10  *TA w/ Marching x10   Supine HS " "Stretch w/ strap 30\" x 2 BL  SHAYY x10   PB Press into table (Stdg) 5\" x10      (*denotes HEP)      EDUCATION:         "

## 2024-02-06 ENCOUNTER — APPOINTMENT (OUTPATIENT)
Dept: PHYSICAL THERAPY | Facility: CLINIC | Age: 74
End: 2024-02-06
Payer: MEDICARE

## 2024-02-09 ENCOUNTER — TREATMENT (OUTPATIENT)
Dept: PHYSICAL THERAPY | Facility: CLINIC | Age: 74
End: 2024-02-09
Payer: MEDICARE

## 2024-02-09 DIAGNOSIS — M54.16 LUMBAR RADICULOPATHY: Primary | ICD-10-CM

## 2024-02-09 DIAGNOSIS — M25.551 LATERAL PAIN OF RIGHT HIP: ICD-10-CM

## 2024-02-09 PROCEDURE — 97140 MANUAL THERAPY 1/> REGIONS: CPT | Mod: GP | Performed by: PHYSICAL THERAPIST

## 2024-02-09 PROCEDURE — 97110 THERAPEUTIC EXERCISES: CPT | Mod: GP | Performed by: PHYSICAL THERAPIST

## 2024-02-09 ASSESSMENT — PAIN SCALES - GENERAL: PAINLEVEL_OUTOF10: 2

## 2024-02-09 NOTE — PROGRESS NOTES
"Physical Therapy    Physical Therapy Treatment    Patient Name: Carly Cheung  MRN: [id number]  Today's Date: 2/9/2024    Insurance: [employer] Medicare  Allowed visits: 6 (1/15/24 - 3/14/24)     Subjective  Patient with improvement in low back pain. She had an epidural injection 2 days ago which has helped tremendously. She has had moments of no pain but does occasionally have episodes though these have been less intense. She slept well last night. She has also had improvement with HEP but has not performed since epidural injection; she has been very cautious \"so the medicine doesn't squeeze out of there\". She continues to have a \"hot spot\" in area of right piriformis.     Objective  Reviewed and progressed marked therapeutic exercise per patient tolerance (23022 - 37 minutes, 2 units): NAKITA 2'  *Press ups x10  Bridges with ball squeeze 3\"2x10  *SKTC isometrics 5\"x10 ea  *Seated march with trunk extension to engage abdominals x10 ea  Mini squats with glute set 2x10  *SHAYY 2x10    *added to HEP    MT (19083 - 8 minutes, 1 unit) PA glides to L3-L5 grade II-III; unilateral PA glides to left transverse processes for right sided mobility per patient tolerance. Discussed benefits of dry needling.     Assessment  Patient tolerated workout well with some discomfort with extension exercises but more of a stretch/pull than concordant pain. She demonstrates excellent carryover with exercises overall. She is a good candidate for dry needling as pain is likely neuropathic in nature; she had not eaten prior to visit today so intervention was held. Informed consent was obtained today.     Plan  Continue per POC at this time.      "

## 2024-02-22 ENCOUNTER — TREATMENT (OUTPATIENT)
Dept: PHYSICAL THERAPY | Facility: CLINIC | Age: 74
End: 2024-02-22
Payer: MEDICARE

## 2024-02-22 DIAGNOSIS — G89.29 CHRONIC RIGHT HIP PAIN: ICD-10-CM

## 2024-02-22 DIAGNOSIS — M70.61 TROCHANTERIC BURSITIS OF RIGHT HIP: ICD-10-CM

## 2024-02-22 DIAGNOSIS — M25.551 CHRONIC RIGHT HIP PAIN: ICD-10-CM

## 2024-02-22 DIAGNOSIS — M54.16 LUMBAR RADICULOPATHY: Primary | ICD-10-CM

## 2024-02-22 DIAGNOSIS — M25.551 LATERAL PAIN OF RIGHT HIP: ICD-10-CM

## 2024-02-22 PROCEDURE — 97110 THERAPEUTIC EXERCISES: CPT | Mod: GP,CQ

## 2024-02-22 ASSESSMENT — PAIN SCALES - GENERAL: PAINLEVEL_OUTOF10: 1

## 2024-02-22 ASSESSMENT — PAIN DESCRIPTION - DESCRIPTORS: DESCRIPTORS: ACHING

## 2024-02-22 NOTE — PROGRESS NOTES
"Physical Therapy Treatment    Patient Name: Carly Cheung  MRN: [id number]  Today's Date: 2/22/2024  Time Calculation  Start Time: 0745  Stop Time: 0823  Time Calculation (min): 38 min     PT Therapeutic Procedures Time Entry  Therapeutic Exercise Time Entry: 38                 Insurance:   Insurance: [employer]  Visit # 5   Allowed visits: 6 (1/15/24 - 3/14/24)   Assessment:   Pt w/ some R leg cramping (HS) w/ bridges today however it did subside and she was able to complete given reps. Improving nandini to remainder of ex's w/ appropriate challenge. Cues to engage core w/ ex's w/ good f/t.   Pt compliant w/ HEP and going to [facility] (silver sneakers). She demo's good understanding of what she needs to cont to work on.     Plan:   IDALIA cunningham tomorrow, 2/23/24. (Dear[place]) Discuss outcome of appt at next PT, sched for 3/1/24.     Current Problem  1. Lumbar radiculopathy  Follow Up In Physical Therapy      2. Lateral pain of right hip  Follow Up In Physical Therapy      3. Chronic right hip pain        4. Trochanteric bursitis of right hip            Subjective   General   \"It's the transition movements that I feel it the most.\" (sit to stand, stand to sit and the first couple steps after sitting for a bit.\" Pt states the back is still uncomfortable and achy across both sides however is \"much better\" that it has been. Less sharp pain and more of an \"ache\". \"I see a lot of improvements.\" Pt feels about 80% back to normal since starting PT and getting injection.   Precautions       Pain  Pain Score: 1  Pain Location: Back  Pain Orientation: Right  Pain Descriptors: Aching    Objective     Treatments:  Therapeutic Exercise (53555):   NAKITA 2'  *Press ups x10   Bridges with ball squeeze 3\"2x10  *SKTC isometrics 5\"x10 ea  *Seated march with trunk extension to engage abdominals x10 ea   Mini squats with glute set 2x10  *SHAYY 2x10     *added to HEP      Manual (93265):  PA glides to L3-L5 grade II-III; unilateral PA glides to left " transverse processes for right sided mobility per patient tolerance. -5 min     EDUCATION:

## 2024-02-23 ENCOUNTER — OFFICE VISIT (OUTPATIENT)
Dept: ORTHOPEDIC SURGERY | Facility: CLINIC | Age: 74
End: 2024-02-23
Payer: MEDICARE

## 2024-02-23 DIAGNOSIS — M54.16 LUMBAR RADICULOPATHY: Primary | ICD-10-CM

## 2024-02-23 PROCEDURE — 99213 OFFICE O/P EST LOW 20 MIN: CPT | Performed by: PHYSICIAN ASSISTANT

## 2024-02-23 PROCEDURE — 1125F AMNT PAIN NOTED PAIN PRSNT: CPT | Performed by: PHYSICIAN ASSISTANT

## 2024-02-23 PROCEDURE — 1036F TOBACCO NON-USER: CPT | Performed by: PHYSICIAN ASSISTANT

## 2024-02-23 NOTE — PROGRESS NOTES
aCrly Cheung is a 73 y.o. female who presents for Follow-up of the Lower Back (After therapy and pain management, says she is about 80% better).    HPI:  73-year-old female here for follow-up of low back pain and claudication symptoms.  She did a course of physical therapy.  She denies any fever chills nausea vomiting night sweats.  She has no bowel or bladder complaints.    Physical exam:  Well-nourished, well kept.No lymphangitis or lymphadenopathy in the examined extremities.  Gait normal.  Can stand on heels and toes.   Examination of the back shows intimal tenderness in the paraspinous musculature.  There is no decreased range of motion. There is good strength and no instability.  Examination of the lower extremities reveals no point tenderness, swelling, or deformity.  Range of motion of the hips, knees, and ankles are full without crepitance, instability, or exacerbation of pain.  Strength is 5/5 throughout.  No redness, abrasions, or lesions on extremities  Gross sensation intact in the extremities.  Affect normal.  Alert and oriented ×3.  Coordination normal.    Assessment:  73-year-old female here for follow-up of low back pain and claudication symptoms.  She did a course of physical therapy and she also got into see Dr. Cruz for pain management.  Her last gave her an epidural injection and between that and the physical therapy she is at least 80% better.  She is very happy with where she is at right now.  We had a lengthy discussion about options, she understands that if she were to ever come back and want to discuss surgery that she will meet with Dr. Whelan directly.  She also understands that there is a waiting period between epidural injections and having surgery, she is amenable to that.    Plan:  At this point I will just see her back on a as needed basis.  If she ever does decide to have surgery she will meet with Dr. Whelan directly.

## 2024-03-01 ENCOUNTER — TREATMENT (OUTPATIENT)
Dept: PHYSICAL THERAPY | Facility: CLINIC | Age: 74
End: 2024-03-01
Payer: MEDICARE

## 2024-03-01 DIAGNOSIS — M25.551 LATERAL PAIN OF RIGHT HIP: ICD-10-CM

## 2024-03-01 DIAGNOSIS — M54.16 LUMBAR RADICULOPATHY: ICD-10-CM

## 2024-03-01 PROCEDURE — 97140 MANUAL THERAPY 1/> REGIONS: CPT | Mod: GP | Performed by: PHYSICAL THERAPIST

## 2024-03-01 PROCEDURE — 97110 THERAPEUTIC EXERCISES: CPT | Mod: GP | Performed by: PHYSICAL THERAPIST

## 2024-03-01 ASSESSMENT — PAIN SCALES - GENERAL: PAINLEVEL_OUTOF10: 3

## 2024-03-01 NOTE — PROGRESS NOTES
Physical Therapy    Physical Therapy Re-Evaluation    Patient Name: Carly Cheung  MRN: [id number]  Today's Date: 3/1/2024    Insurance: [employer] Medicare  Allowed visits: 6 (1/15/24 - 3/14/24)     Subjective  Patient with improvement in her right hip and back overall. She does have good days and bad days. She has been compliant with her HEP as well as [facility] workouts. She does continue to get relief from epidural injection and will F/U with pain management physician next week. She does feel as though weather fluctuation is a factor. She does not want to undergo a surgery for this problem and made this clear to referring PADOM. She does feel as though physical therapy is helping. She is interested in going forward with dry needling.     Objective  Worst pain in the last 24 hours, 3 improved from 6/10     Precautions: slight fall risk     Observation: right C-curve at thoracic spine      Gait Assessment: ambulated into clinic without an assisitive device. Trendelenburg pattern present. Wears bilateral AFOs.      AROM  Lumbar flexion: decreased 50% with minimal reversal of lordosis P!   Lumbar extension: decreased 25%, improved from 50% with most movement at mid-lumbar spine  Lumbar sidebending right: decreased 50%    Sidebending left: decreased 25%   Lumbar rotation right: decreased 25%    Rotation left: decreased 25%      MMT:  (L3-L4) Right quadriceps: 4+    Left quadriceps: 4+  (L1-L2) Right iliopsoas: 4+    Left iliopsoas: 4+  (S2) Right hip abductors supine: good    Left hip abductors supine: good   (L1-L2) Right hip adductors supine: good    Left hip adductors supine: good   (L5-S1) Right gluteus deidre: 4+    Left gluteus deidre: 4+  (S1-S2) Right hamstrings: 5    Left hamstrings: 5  Lower abdominals: 4, improved from 2/10    Upper abdominals: 10/10     Flexibility:  Right quadriceps: slightly limited, improved from limited    Left quadriceps: WNL     Mobility: decreased mobility throughout lumbar spine,  "most severely at lower segments    LOTUS: 20%, improved from 22%    Re-evaluation performed on this date with new objective measurements obtained as above. See updated goals below.    Reviewed and progressed marked therapeutic exercise per patient tolerance (14367 - 28 minutes, 2 units) NAKITA 2'  Prone press ups x10  Bridges with ball squeeze 3\"2x10  SKTC isometrics 5\"x10 ea  Seated march with trunk extension to engage abs x10  SHAYY 2x10    MT (20413 - 12 minutes, 1 unit) Dry needling performed this date using 50 mm/2\" needles to L3-L5 and 75 mm/3\" needles to right inferior gluteal homeostatic point and just posterior to greater trochanter. Informed consent obtained today and is on file in patient's chart. [place] precautions and clean technique utilized throughout. Skin was inspected for any adverse reactions and none were present. Patient was educated to avoid NSAIDs and use of ice for the next 24 hours to allow for maximum healing potential.     Assessment  Patient with improvement in symptoms overall. She is highly motivated to progress with conservative care. She was mildly sensitive with dry needling with discomfort elicited especially at right L3 and L4 points. Did well with 3 inch needles to gluteal and hip points however. She continues to demonstrate good carryover with exercises. She will benefit from continued PT management to promoted continued core strengthening and mobility and expand on dry needling if it provides relief. Good potential.     Plan  Recommending continued skilled care 1x/week for 4-6 weeks, 4-6 visits. Mayra only for dry needling.     Goals  Independent with HEP to expedite progress and promote goal achievement - partially met  Decrease LOTUS score to < or equal to 12% for evidence of improved function - partially met  Increase AROM lumbar spine flexion, extension, and right SB by > or equal to 25% for increased ease of movement and tolerance to position changes - partially met  Increase " strength lower abdominals to > or equal to 6/10 and bilateral hip flexors to 5/5 for improved core stability and tolerance to prolonged positions - partially met  Decrease pain at worst to < or equal to 2/10 for improved QOL and tolerance to wellness activities - partially met

## 2024-03-07 ENCOUNTER — TELEPHONE (OUTPATIENT)
Dept: PHYSICAL THERAPY | Facility: CLINIC | Age: 74
End: 2024-03-07
Payer: MEDICARE

## 2024-03-15 ENCOUNTER — TREATMENT (OUTPATIENT)
Dept: PHYSICAL THERAPY | Facility: CLINIC | Age: 74
End: 2024-03-15
Payer: MEDICARE

## 2024-03-15 DIAGNOSIS — M54.16 LUMBAR RADICULOPATHY: Primary | ICD-10-CM

## 2024-03-15 DIAGNOSIS — M25.551 LATERAL PAIN OF RIGHT HIP: ICD-10-CM

## 2024-03-15 PROCEDURE — 97140 MANUAL THERAPY 1/> REGIONS: CPT | Mod: GP | Performed by: PHYSICAL THERAPIST

## 2024-03-15 PROCEDURE — 97110 THERAPEUTIC EXERCISES: CPT | Mod: GP | Performed by: PHYSICAL THERAPIST

## 2024-03-15 ASSESSMENT — PAIN SCALES - GENERAL: PAINLEVEL_OUTOF10: 1

## 2024-03-15 NOTE — PROGRESS NOTES
"Physical Therapy    Physical Therapy Treatment    Patient Name: Carly Cheung  MRN: [id number]  Today's Date: 3/15/2024    Insurance: [employer] Medicare  Allowed visits: 4 (3/4/24-4/2/24)    Subjective  Patient had increased symptoms following dry needling \"for that whole weekend\". She did have a follow-up with pain management physician who advised use of Aleve over Tylenol which has provided relief. She does not wish to continue with dry needling today.     Objective  Reviewed and progressed marked therapeutic exercise per patient tolerance (94361 - 30 minutes, 2 units) NAKITA 2'  Prone press ups 2x10  Bridges with ball squeeze 3\"2x12  SKTC isometrics 5\"x10 ea  *DKTC 10\"x10  SHAYY 2x10  *Antirotation step outs RTB x10 ea    *added to HEP     MT (44704 - 8 minutes, 1 unit) PA glides to L4-L5 per patient tolerance; grade II-III unilateral/TP glides to left lumbar spine for right sided mobility.     Assessment  Patient tolerated workout well but challenged with upgrades to abdominal strengthening. She may benefit from progression to quadruped position for strength and stability progression though shoulder pain may limit her. Encouraged continued compliance with HEP.     Plan  Continue per POC at this time. Focus on abdominal strengthening.     "

## 2024-03-20 ENCOUNTER — TELEPHONE (OUTPATIENT)
Dept: PRIMARY CARE | Facility: CLINIC | Age: 74
End: 2024-03-20

## 2024-03-20 ENCOUNTER — OFFICE VISIT (OUTPATIENT)
Dept: PRIMARY CARE | Facility: CLINIC | Age: 74
End: 2024-03-20
Payer: MEDICARE

## 2024-03-20 VITALS
HEART RATE: 61 BPM | TEMPERATURE: 97.3 F | DIASTOLIC BLOOD PRESSURE: 74 MMHG | OXYGEN SATURATION: 95 % | SYSTOLIC BLOOD PRESSURE: 119 MMHG

## 2024-03-20 DIAGNOSIS — H61.22 IMPACTED CERUMEN OF LEFT EAR: Primary | ICD-10-CM

## 2024-03-20 PROCEDURE — 1160F RVW MEDS BY RX/DR IN RCRD: CPT | Performed by: NURSE PRACTITIONER

## 2024-03-20 PROCEDURE — 99213 OFFICE O/P EST LOW 20 MIN: CPT | Performed by: NURSE PRACTITIONER

## 2024-03-20 PROCEDURE — 1123F ACP DISCUSS/DSCN MKR DOCD: CPT | Performed by: NURSE PRACTITIONER

## 2024-03-20 PROCEDURE — 69210 REMOVE IMPACTED EAR WAX UNI: CPT | Performed by: NURSE PRACTITIONER

## 2024-03-20 PROCEDURE — 1036F TOBACCO NON-USER: CPT | Performed by: NURSE PRACTITIONER

## 2024-03-20 PROCEDURE — 1159F MED LIST DOCD IN RCRD: CPT | Performed by: NURSE PRACTITIONER

## 2024-03-20 PROCEDURE — 3074F SYST BP LT 130 MM HG: CPT | Performed by: NURSE PRACTITIONER

## 2024-03-20 PROCEDURE — 3078F DIAST BP <80 MM HG: CPT | Performed by: NURSE PRACTITIONER

## 2024-03-20 PROCEDURE — 1158F ADVNC CARE PLAN TLK DOCD: CPT | Performed by: NURSE PRACTITIONER

## 2024-03-20 ASSESSMENT — PATIENT HEALTH QUESTIONNAIRE - PHQ9
SUM OF ALL RESPONSES TO PHQ9 QUESTIONS 1 AND 2: 0
1. LITTLE INTEREST OR PLEASURE IN DOING THINGS: NOT AT ALL
2. FEELING DOWN, DEPRESSED OR HOPELESS: NOT AT ALL

## 2024-03-20 NOTE — PROGRESS NOTES
Subjective   Patient ID: Carly Cheung is a 73 y.o. female who presents for Ear Fullness.    Ears clogged - left ear   Can't hear with hearing aids  2 weeks   Using debrox           Review of Systems  ROS completely negative except what was mentioned in the HPI.  Problem List, surgical, social, and family histories which were reviewed and updated as necessary within the EMR. I also personally reviewed the notes, labs, and imaging that pertained to what was documented or discussed in the HPI.    Objective   Physical Exam  Vitals and nursing note reviewed.   Constitutional:       General: She is not in acute distress.     Appearance: Normal appearance.   HENT:      Head: Normocephalic and atraumatic.      Right Ear: Ear canal and external ear normal.      Left Ear: Ear canal and external ear normal. There is impacted cerumen.      Ears:      Comments: Post cerumen removal, L TM normal     Nose: Nose normal.      Mouth/Throat:      Mouth: Mucous membranes are moist.   Eyes:      Extraocular Movements: Extraocular movements intact.      Conjunctiva/sclera: Conjunctivae normal.      Pupils: Pupils are equal, round, and reactive to light.   Cardiovascular:      Rate and Rhythm: Normal rate and regular rhythm.      Heart sounds: Normal heart sounds.   Pulmonary:      Effort: Pulmonary effort is normal.      Breath sounds: Normal breath sounds.   Musculoskeletal:         General: Normal range of motion.      Cervical back: Normal range of motion and neck supple.   Skin:     General: Skin is warm and dry.   Neurological:      General: No focal deficit present.      Mental Status: She is alert and oriented to person, place, and time. Mental status is at baseline.   Psychiatric:         Mood and Affect: Mood normal.         Behavior: Behavior normal.         Thought Content: Thought content normal.         Judgment: Judgment normal.         /74   Pulse 61   Temp 36.3 °C (97.3 °F) (Temporal)   SpO2 95%      Assessment/Plan    Problem List Items Addressed This Visit    None  Visit Diagnoses       Impacted cerumen of left ear    -  Primary           Patient ID: Carly Cheung is a 73 y.o. female.    Ear Cerumen Removal    Date/Time: 3/20/2024 2:06 PM    Performed by: SERGIO Buitrago  Authorized by: SERGIO Buitrago    Consent:     Consent obtained:  Verbal    Consent given by:  Patient    Risks, benefits, and alternatives were discussed: yes      Risks discussed:  Pain, TM perforation, incomplete removal, bleeding and infection    Alternatives discussed:  No treatment and referral  Universal protocol:     Procedure explained and questions answered to patient or proxy's satisfaction: yes      Patient identity confirmed:  Verbally with patient  Procedure details:     Location:  L ear    Procedure type: curette      Procedure outcomes: cerumen removed    Post-procedure details:     Inspection:  TM intact and no bleeding    Hearing quality:  Improved    Procedure completion:  Tolerated

## 2024-03-20 NOTE — TELEPHONE ENCOUNTER
Pt called had a hearing aid appt tech said she had a large piece of wax in her ear canal  She was using debrox and warm water without any success and it is more difficult to hear spoke to NP states ok to add on to either Np's schedule today to look at it  Left vm for pt to call back for an appt today   Can you try calling her to schedule

## 2024-03-26 ENCOUNTER — TREATMENT (OUTPATIENT)
Dept: PHYSICAL THERAPY | Facility: CLINIC | Age: 74
End: 2024-03-26
Payer: MEDICARE

## 2024-03-26 PROCEDURE — 97140 MANUAL THERAPY 1/> REGIONS: CPT | Mod: GP,CQ

## 2024-03-26 PROCEDURE — 97110 THERAPEUTIC EXERCISES: CPT | Mod: GP,CQ

## 2024-03-26 ASSESSMENT — PAIN SCALES - GENERAL: PAINLEVEL_OUTOF10: 2

## 2024-03-26 NOTE — PROGRESS NOTES
"Physical Therapy    Physical Therapy Treatment    Patient Name: Carly Cheung  MRN: [id number]  Today's Date: 3/26/2024    Insurance: [employer]  Allowed visits: 4 (3/4/24-4/2/24) visit 2/4  Time in 12:58 PM  Time out 1:28PM    Subjective  2/10 pain, patient was able to go for a walk yesterday and attend aerobics class with minimal increase in pain     Objective  Reviewed and progressed marked therapeutic exercise per patient tolerance (98983 - 25 minutes, 1 unit)   NAKITA 2'  Prone press ups 2x10  Bridges with ball squeeze 3\"2x12  SKTC isometrics 5\"x10 ea  DKTC 10\"x10  SHAYY 2x10  Antirotation step outs RTB x10 ea NT         MT (84568 - 8 minutes, 1 unit) PA glides to L4-L5 per patient tolerance; grade II-III unilateral/TP glides to left lumbar spine for right sided mobility.     Assessment  Patient tolerated workout well with no increase in pain. Decreased resting pain following manual treatment. . Encouraged continued compliance with HEP.     Plan  Continue per POC at this time. Focus on abdominal strengthening.     "

## 2024-04-02 ENCOUNTER — TREATMENT (OUTPATIENT)
Dept: PHYSICAL THERAPY | Facility: CLINIC | Age: 74
End: 2024-04-02
Payer: MEDICARE

## 2024-04-02 DIAGNOSIS — M25.551 LATERAL PAIN OF RIGHT HIP: ICD-10-CM

## 2024-04-02 DIAGNOSIS — M54.16 LUMBAR RADICULOPATHY: Primary | ICD-10-CM

## 2024-04-02 PROCEDURE — 97140 MANUAL THERAPY 1/> REGIONS: CPT | Mod: GP | Performed by: PHYSICAL THERAPIST

## 2024-04-02 PROCEDURE — 97110 THERAPEUTIC EXERCISES: CPT | Mod: GP | Performed by: PHYSICAL THERAPIST

## 2024-04-02 ASSESSMENT — PAIN SCALES - GENERAL: PAINLEVEL_OUTOF10: 0 - NO PAIN

## 2024-04-02 NOTE — PROGRESS NOTES
"Physical Therapy    Physical Therapy Re-Evaluation    Patient Name: Carly Cheung  MRN: [id number]  Today's Date: 4/2/2024    Insurance: [employer] [place] [employer]  Allowed visits: 4 (3/4/24-4/2/24)    Subjective  Patient is without pain today. Chief complaint is \"my shoulder\" and is being followed by Dr. Lopes for this. Biggest limitation is \"I'm not sure if I'm going to have a flare up\". She has postponed an additional injection with pain management. She has been able to stay busy and is participating in her exercise classes and walking activities. She does get tired quicker than she did previously. 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 She is taking Meloxicam with relief.     Objective  Worst pain in the last 24 hours, 0 improved from 6/10     Precautions: slight fall risk     Observation: right C-curve at thoracic spine      Gait Assessment: ambulated into clinic without an assisitive device. Trendelenburg pattern present. Wears bilateral AFOs.      AROM  Lumbar flexion: decreased 50% with minimal reversal of lordosis  Lumbar extension: decreased 25%,  Lumbar sidebending right: decreased 50%    Sidebending left: decreased 25%   Lumbar rotation right: decreased 25%    Rotation left: decreased 25%      MMT:  (L3-L4) Right quadriceps: 5, improved from 4+    Left quadriceps: 5, improved from 4+  (L1-L2) Right iliopsoas: 5, improved from 4+    Left iliopsoas: 4+  (S2) Right hip abductors supine: good    Left hip abductors supine: good   (L1-L2) Right hip adductors supine: good    Left hip adductors supine: good   (L5-S1) Right gluteus deidre: 4+    Left gluteus deidre: 5, improved from 4+  (S1-S2) Right hamstrings: 5    Left hamstrings: 5  Lower abdominals: 4/10    Upper abdominals: 10/10     Mobility: decreased mobility throughout lumbar spine, most severely at lower segments     LOTUS: 6%, improved from 22%     Re-evaluation performed on this date with new objective measurements obtained " "as above. See updated goals below.    Reviewed and progressed marked therapeutic exercise per patient tolerance (69076 - 32 minutes, 2 units) NAKITA 2'  Prone press ups 2x10  Bridges with ball squeeze 3\"2x12  SKTC isometrics 5\"x12 ea  DKTC 10\"x12  SHAYY 2x10  Antirotation step outs GTB x10 ea     MT (59559 - 8 minutes, 1 unit) PA glides to L4-L5 per patient tolerance; grade II-III unilateral/TP glides to left lumbar spine for right sided mobility.     Assessment  Patient with much improvement in low back pain symptoms. She is without functional limitations at this time and is well-versed in a HEP. She understands importance of continued compliance with exercises to maintain therapeutic gains. Recommending discharge from this episode of care.     Plan  No further visits planned at this time.     Goals  Independent with HEP to expedite progress and promote goal achievement - met  Decrease LOTUS score to < or equal to 12% for evidence of improved function - met  Increase AROM lumbar spine flexion, extension, and right SB by > or equal to 25% for increased ease of movement and tolerance to position changes - partially met  Increase strength lower abdominals to > or equal to 6/10 and bilateral hip flexors to 5/5 for improved core stability and tolerance to prolonged positions - no change  Decrease pain at worst to < or equal to 2/10 for improved QOL and tolerance to wellness activities - met    "

## 2024-04-15 ENCOUNTER — APPOINTMENT (OUTPATIENT)
Dept: PHYSICAL THERAPY | Facility: CLINIC | Age: 74
End: 2024-04-15
Payer: MEDICARE

## 2024-04-16 DIAGNOSIS — I73.00 RAYNAUD'S PHENOMENON WITHOUT GANGRENE: ICD-10-CM

## 2024-04-16 DIAGNOSIS — Z87.19 H/O ESOPHAGEAL ULCER: ICD-10-CM

## 2024-04-16 DIAGNOSIS — I10 HYPERTENSION, ESSENTIAL: ICD-10-CM

## 2024-04-16 RX ORDER — AMLODIPINE BESYLATE 2.5 MG/1
2.5 TABLET ORAL DAILY
Qty: 90 TABLET | Refills: 3 | Status: SHIPPED | OUTPATIENT
Start: 2024-04-16 | End: 2025-04-16

## 2024-04-16 RX ORDER — PANTOPRAZOLE SODIUM 20 MG/1
20 TABLET, DELAYED RELEASE ORAL
Qty: 90 TABLET | Refills: 3 | Status: SHIPPED | OUTPATIENT
Start: 2024-04-16 | End: 2025-04-16

## 2024-04-22 ENCOUNTER — APPOINTMENT (OUTPATIENT)
Dept: PHYSICAL THERAPY | Facility: CLINIC | Age: 74
End: 2024-04-22
Payer: MEDICARE

## 2024-05-21 ENCOUNTER — LAB (OUTPATIENT)
Dept: LAB | Facility: LAB | Age: 74
End: 2024-05-21
Payer: MEDICARE

## 2024-05-21 ENCOUNTER — TELEPHONE (OUTPATIENT)
Dept: PRIMARY CARE | Facility: CLINIC | Age: 74
End: 2024-05-21

## 2024-05-21 DIAGNOSIS — E55.9 VITAMIN D DEFICIENCY: ICD-10-CM

## 2024-05-21 DIAGNOSIS — E78.5 HYPERLIPIDEMIA, UNSPECIFIED HYPERLIPIDEMIA TYPE: ICD-10-CM

## 2024-05-21 DIAGNOSIS — I10 HYPERTENSION, ESSENTIAL: ICD-10-CM

## 2024-05-21 LAB
25(OH)D3 SERPL-MCNC: 42 NG/ML (ref 30–100)
ALBUMIN SERPL BCP-MCNC: 4.7 G/DL (ref 3.4–5)
ALP SERPL-CCNC: 73 U/L (ref 33–136)
ALT SERPL W P-5'-P-CCNC: 19 U/L (ref 7–45)
ANION GAP SERPL CALC-SCNC: 11 MMOL/L (ref 10–20)
APPEARANCE UR: CLEAR
AST SERPL W P-5'-P-CCNC: 27 U/L (ref 9–39)
BASOPHILS # BLD AUTO: 0.04 X10*3/UL (ref 0–0.1)
BASOPHILS NFR BLD AUTO: 0.8 %
BILIRUB SERPL-MCNC: 0.7 MG/DL (ref 0–1.2)
BILIRUB UR STRIP.AUTO-MCNC: NEGATIVE MG/DL
BUN SERPL-MCNC: 13 MG/DL (ref 6–23)
CALCIUM SERPL-MCNC: 10 MG/DL (ref 8.6–10.3)
CHLORIDE SERPL-SCNC: 99 MMOL/L (ref 98–107)
CHOLEST SERPL-MCNC: 222 MG/DL (ref 0–199)
CHOLESTEROL/HDL RATIO: 2.3
CO2 SERPL-SCNC: 29 MMOL/L (ref 21–32)
COLOR UR: YELLOW
CREAT SERPL-MCNC: 0.66 MG/DL (ref 0.5–1.05)
CREAT UR-MCNC: 59.9 MG/DL (ref 20–320)
EGFRCR SERPLBLD CKD-EPI 2021: >90 ML/MIN/1.73M*2
EOSINOPHIL # BLD AUTO: 0.14 X10*3/UL (ref 0–0.4)
EOSINOPHIL NFR BLD AUTO: 2.7 %
ERYTHROCYTE [DISTWIDTH] IN BLOOD BY AUTOMATED COUNT: 13.2 % (ref 11.5–14.5)
GLUCOSE SERPL-MCNC: 86 MG/DL (ref 74–99)
GLUCOSE UR STRIP.AUTO-MCNC: NEGATIVE MG/DL
HCT VFR BLD AUTO: 36.3 % (ref 36–46)
HDLC SERPL-MCNC: 95.4 MG/DL
HGB BLD-MCNC: 12.7 G/DL (ref 12–16)
IMM GRANULOCYTES # BLD AUTO: 0.01 X10*3/UL (ref 0–0.5)
IMM GRANULOCYTES NFR BLD AUTO: 0.2 % (ref 0–0.9)
KETONES UR STRIP.AUTO-MCNC: NEGATIVE MG/DL
LDLC SERPL CALC-MCNC: 114 MG/DL
LEUKOCYTE ESTERASE UR QL STRIP.AUTO: ABNORMAL
LYMPHOCYTES # BLD AUTO: 2.1 X10*3/UL (ref 0.8–3)
LYMPHOCYTES NFR BLD AUTO: 40.2 %
MCH RBC QN AUTO: 31.1 PG (ref 26–34)
MCHC RBC AUTO-ENTMCNC: 35 G/DL (ref 32–36)
MCV RBC AUTO: 89 FL (ref 80–100)
MICROALBUMIN UR-MCNC: <7 MG/L
MICROALBUMIN/CREAT UR: NORMAL MG/G{CREAT}
MONOCYTES # BLD AUTO: 0.54 X10*3/UL (ref 0.05–0.8)
MONOCYTES NFR BLD AUTO: 10.3 %
NEUTROPHILS # BLD AUTO: 2.39 X10*3/UL (ref 1.6–5.5)
NEUTROPHILS NFR BLD AUTO: 45.8 %
NITRITE UR QL STRIP.AUTO: NEGATIVE
NON HDL CHOLESTEROL: 127 MG/DL (ref 0–149)
NRBC BLD-RTO: 0 /100 WBCS (ref 0–0)
PH UR STRIP.AUTO: 7 [PH]
PLATELET # BLD AUTO: 319 X10*3/UL (ref 150–450)
POTASSIUM SERPL-SCNC: 4.9 MMOL/L (ref 3.5–5.3)
PROT SERPL-MCNC: 7 G/DL (ref 6.4–8.2)
PROT UR STRIP.AUTO-MCNC: NEGATIVE MG/DL
RBC # BLD AUTO: 4.08 X10*6/UL (ref 4–5.2)
RBC # UR STRIP.AUTO: NEGATIVE /UL
RBC #/AREA URNS AUTO: NORMAL /HPF
SODIUM SERPL-SCNC: 134 MMOL/L (ref 136–145)
SP GR UR STRIP.AUTO: 1.01
SQUAMOUS #/AREA URNS AUTO: NORMAL /HPF
TRIGL SERPL-MCNC: 62 MG/DL (ref 0–149)
TSH SERPL-ACNC: 1.27 MIU/L (ref 0.44–3.98)
UROBILINOGEN UR STRIP.AUTO-MCNC: <2 MG/DL
VLDL: 12 MG/DL (ref 0–40)
WBC # BLD AUTO: 5.2 X10*3/UL (ref 4.4–11.3)
WBC #/AREA URNS AUTO: NORMAL /HPF

## 2024-05-21 PROCEDURE — 82306 VITAMIN D 25 HYDROXY: CPT

## 2024-05-21 PROCEDURE — 85025 COMPLETE CBC W/AUTO DIFF WBC: CPT

## 2024-05-21 PROCEDURE — 82570 ASSAY OF URINE CREATININE: CPT

## 2024-05-21 PROCEDURE — 82043 UR ALBUMIN QUANTITATIVE: CPT

## 2024-05-21 PROCEDURE — 36415 COLL VENOUS BLD VENIPUNCTURE: CPT

## 2024-05-21 PROCEDURE — 84443 ASSAY THYROID STIM HORMONE: CPT

## 2024-05-21 PROCEDURE — 81001 URINALYSIS AUTO W/SCOPE: CPT

## 2024-05-21 PROCEDURE — 80061 LIPID PANEL: CPT

## 2024-05-21 PROCEDURE — 80053 COMPREHEN METABOLIC PANEL: CPT

## 2024-05-31 ENCOUNTER — OFFICE VISIT (OUTPATIENT)
Dept: PRIMARY CARE | Facility: CLINIC | Age: 74
End: 2024-05-31
Payer: MEDICARE

## 2024-05-31 VITALS
TEMPERATURE: 96.5 F | DIASTOLIC BLOOD PRESSURE: 70 MMHG | WEIGHT: 142.5 LBS | OXYGEN SATURATION: 95 % | BODY MASS INDEX: 25.25 KG/M2 | SYSTOLIC BLOOD PRESSURE: 128 MMHG | HEIGHT: 63 IN | HEART RATE: 70 BPM

## 2024-05-31 DIAGNOSIS — M47.816 LUMBAR SPONDYLOSIS: ICD-10-CM

## 2024-05-31 DIAGNOSIS — Z12.11 SCREENING FOR COLORECTAL CANCER: ICD-10-CM

## 2024-05-31 DIAGNOSIS — Z71.89 ADVANCED DIRECTIVES, COUNSELING/DISCUSSION: ICD-10-CM

## 2024-05-31 DIAGNOSIS — Z13.39 ALCOHOL SCREENING: ICD-10-CM

## 2024-05-31 DIAGNOSIS — E87.1 HYPONATREMIA: ICD-10-CM

## 2024-05-31 DIAGNOSIS — R93.1 ABNORMAL SCREENING CARDIAC CT: ICD-10-CM

## 2024-05-31 DIAGNOSIS — M51.9 LUMBAR DISC DISEASE: ICD-10-CM

## 2024-05-31 DIAGNOSIS — Z13.89 SCREENING FOR MULTIPLE CONDITIONS: ICD-10-CM

## 2024-05-31 DIAGNOSIS — H34.8192: ICD-10-CM

## 2024-05-31 DIAGNOSIS — I10 HYPERTENSION, ESSENTIAL: ICD-10-CM

## 2024-05-31 DIAGNOSIS — M19.90 IDIOPATHIC OSTEOARTHRITIS: ICD-10-CM

## 2024-05-31 DIAGNOSIS — L20.84 INTRINSIC ECZEMA: ICD-10-CM

## 2024-05-31 DIAGNOSIS — M19.90 ARTHRITIS: ICD-10-CM

## 2024-05-31 DIAGNOSIS — Z12.31 ENCOUNTER FOR SCREENING MAMMOGRAM FOR BREAST CANCER: ICD-10-CM

## 2024-05-31 DIAGNOSIS — K21.9 GERD WITHOUT ESOPHAGITIS: ICD-10-CM

## 2024-05-31 DIAGNOSIS — Z00.00 ROUTINE ADULT HEALTH MAINTENANCE: Primary | ICD-10-CM

## 2024-05-31 DIAGNOSIS — Z12.12 SCREENING FOR COLORECTAL CANCER: ICD-10-CM

## 2024-05-31 DIAGNOSIS — E78.2 MIXED HYPERLIPIDEMIA: ICD-10-CM

## 2024-05-31 DIAGNOSIS — Z13.6 SCREENING FOR CARDIOVASCULAR CONDITION: ICD-10-CM

## 2024-05-31 DIAGNOSIS — E55.9 VITAMIN D DEFICIENCY: ICD-10-CM

## 2024-05-31 DIAGNOSIS — Z71.89 CARDIAC RISK COUNSELING: ICD-10-CM

## 2024-05-31 PROBLEM — R60.9 EDEMA, PERIPHERAL: Status: RESOLVED | Noted: 2023-10-06 | Resolved: 2024-05-31

## 2024-05-31 PROBLEM — M70.70 HIP BURSITIS: Status: RESOLVED | Noted: 2023-10-06 | Resolved: 2024-05-31

## 2024-05-31 PROBLEM — R53.83 FATIGUE: Status: RESOLVED | Noted: 2023-10-06 | Resolved: 2024-05-31

## 2024-05-31 PROBLEM — R60.0 EDEMA, PERIPHERAL: Status: RESOLVED | Noted: 2023-10-06 | Resolved: 2024-05-31

## 2024-05-31 PROBLEM — Z86.79 HISTORY OF HYPERTENSION: Status: RESOLVED | Noted: 2023-10-06 | Resolved: 2024-05-31

## 2024-05-31 PROBLEM — I25.84 CALCIFICATION OF CORONARY ARTERY: Status: RESOLVED | Noted: 2023-10-06 | Resolved: 2024-05-31

## 2024-05-31 PROBLEM — R30.0 DYSURIA: Status: RESOLVED | Noted: 2023-10-06 | Resolved: 2024-05-31

## 2024-05-31 PROBLEM — M75.82 TENDINITIS OF LEFT ROTATOR CUFF: Status: RESOLVED | Noted: 2023-10-06 | Resolved: 2024-05-31

## 2024-05-31 PROBLEM — I99.8 VASCULAR INSUFFICIENCY: Status: RESOLVED | Noted: 2023-10-06 | Resolved: 2024-05-31

## 2024-05-31 PROBLEM — L03.119 CELLULITIS OF LOWER EXTREMITY: Status: RESOLVED | Noted: 2023-10-06 | Resolved: 2024-05-31

## 2024-05-31 PROBLEM — M25.473 ANKLE EDEMA: Status: RESOLVED | Noted: 2023-10-06 | Resolved: 2024-05-31

## 2024-05-31 PROBLEM — M70.61 TROCHANTERIC BURSITIS OF RIGHT HIP: Status: RESOLVED | Noted: 2023-10-06 | Resolved: 2024-05-31

## 2024-05-31 PROBLEM — M76.821 POSTERIOR TIBIAL TENDINITIS OF RIGHT LOWER EXTREMITY: Status: RESOLVED | Noted: 2023-10-06 | Resolved: 2024-05-31

## 2024-05-31 PROBLEM — M25.551 LATERAL PAIN OF RIGHT HIP: Status: RESOLVED | Noted: 2023-10-10 | Resolved: 2024-05-31

## 2024-05-31 PROBLEM — E66.9 OBESITY: Status: RESOLVED | Noted: 2023-10-06 | Resolved: 2024-05-31

## 2024-05-31 PROBLEM — I25.10 CALCIFICATION OF CORONARY ARTERY: Status: RESOLVED | Noted: 2023-10-06 | Resolved: 2024-05-31

## 2024-05-31 PROBLEM — Z85.828 H/O NONMELANOMA SKIN CANCER: Status: ACTIVE | Noted: 2024-05-31

## 2024-05-31 PROCEDURE — 99214 OFFICE O/P EST MOD 30 MIN: CPT | Performed by: INTERNAL MEDICINE

## 2024-05-31 PROCEDURE — 1160F RVW MEDS BY RX/DR IN RCRD: CPT | Performed by: INTERNAL MEDICINE

## 2024-05-31 PROCEDURE — 1158F ADVNC CARE PLAN TLK DOCD: CPT | Performed by: INTERNAL MEDICINE

## 2024-05-31 PROCEDURE — 1159F MED LIST DOCD IN RCRD: CPT | Performed by: INTERNAL MEDICINE

## 2024-05-31 PROCEDURE — G0446 INTENS BEHAVE THER CARDIO DX: HCPCS | Performed by: INTERNAL MEDICINE

## 2024-05-31 PROCEDURE — 1123F ACP DISCUSS/DSCN MKR DOCD: CPT | Performed by: INTERNAL MEDICINE

## 2024-05-31 PROCEDURE — G0442 ANNUAL ALCOHOL SCREEN 15 MIN: HCPCS | Performed by: INTERNAL MEDICINE

## 2024-05-31 PROCEDURE — 1036F TOBACCO NON-USER: CPT | Performed by: INTERNAL MEDICINE

## 2024-05-31 PROCEDURE — 90677 PCV20 VACCINE IM: CPT | Performed by: INTERNAL MEDICINE

## 2024-05-31 PROCEDURE — 3008F BODY MASS INDEX DOCD: CPT | Performed by: INTERNAL MEDICINE

## 2024-05-31 PROCEDURE — G0439 PPPS, SUBSEQ VISIT: HCPCS | Performed by: INTERNAL MEDICINE

## 2024-05-31 PROCEDURE — G0444 DEPRESSION SCREEN ANNUAL: HCPCS | Performed by: INTERNAL MEDICINE

## 2024-05-31 PROCEDURE — 3078F DIAST BP <80 MM HG: CPT | Performed by: INTERNAL MEDICINE

## 2024-05-31 PROCEDURE — 99497 ADVNCD CARE PLAN 30 MIN: CPT | Performed by: INTERNAL MEDICINE

## 2024-05-31 PROCEDURE — 3074F SYST BP LT 130 MM HG: CPT | Performed by: INTERNAL MEDICINE

## 2024-05-31 PROCEDURE — G0009 ADMIN PNEUMOCOCCAL VACCINE: HCPCS | Performed by: INTERNAL MEDICINE

## 2024-05-31 RX ORDER — MAG HYDROX/ALUMINUM HYD/SIMETH 200-200-20
SUSPENSION, ORAL (FINAL DOSE FORM) ORAL 2 TIMES DAILY
Qty: 28 G | Refills: 0 | Status: SHIPPED | OUTPATIENT
Start: 2024-05-31

## 2024-05-31 RX ORDER — MELOXICAM 7.5 MG/1
7.5 TABLET ORAL 2 TIMES DAILY
Qty: 180 TABLET | Refills: 3 | Status: SHIPPED | OUTPATIENT
Start: 2024-05-31

## 2024-05-31 RX ORDER — ACETAMINOPHEN 500 MG
1000 TABLET ORAL EVERY 8 HOURS PRN
Start: 2024-05-31 | End: 2024-06-10

## 2024-05-31 ASSESSMENT — LIFESTYLE VARIABLES
HOW OFTEN DO YOU HAVE A DRINK CONTAINING ALCOHOL: 4 OR MORE TIMES A WEEK
HOW OFTEN DURING THE LAST YEAR HAVE YOU FAILED TO DO WHAT WAS NORMALLY EXPECTED FROM YOU BECAUSE OF DRINKING: NEVER
AUDIT-C TOTAL SCORE: 4
HOW OFTEN DURING THE LAST YEAR HAVE YOU HAD A FEELING OF GUILT OR REMORSE AFTER DRINKING: NEVER
HAS A RELATIVE, FRIEND, DOCTOR, OR ANOTHER HEALTH PROFESSIONAL EXPRESSED CONCERN ABOUT YOUR DRINKING OR SUGGESTED YOU CUT DOWN: NO
HOW OFTEN DURING THE LAST YEAR HAVE YOU BEEN UNABLE TO REMEMBER WHAT HAPPENED THE NIGHT BEFORE BECAUSE YOU HAD BEEN DRINKING: NEVER
HOW OFTEN DURING THE LAST YEAR HAVE YOU NEEDED AN ALCOHOLIC DRINK FIRST THING IN THE MORNING TO GET YOURSELF GOING AFTER A NIGHT OF HEAVY DRINKING: NEVER
HAVE YOU OR SOMEONE ELSE BEEN INJURED AS A RESULT OF YOUR DRINKING: NO
AUDIT TOTAL SCORE: 4
HOW MANY STANDARD DRINKS CONTAINING ALCOHOL DO YOU HAVE ON A TYPICAL DAY: 1 OR 2
HOW OFTEN DURING THE LAST YEAR HAVE YOU FOUND THAT YOU WERE NOT ABLE TO STOP DRINKING ONCE YOU HAD STARTED: NEVER
HOW OFTEN DO YOU HAVE SIX OR MORE DRINKS ON ONE OCCASION: NEVER
SKIP TO QUESTIONS 9-10: 1

## 2024-05-31 ASSESSMENT — PATIENT HEALTH QUESTIONNAIRE - PHQ9
2. FEELING DOWN, DEPRESSED OR HOPELESS: NOT AT ALL
SUM OF ALL RESPONSES TO PHQ9 QUESTIONS 1 AND 2: 0
1. LITTLE INTEREST OR PLEASURE IN DOING THINGS: NOT AT ALL

## 2024-05-31 NOTE — PROGRESS NOTES
Annual Medicare Wellness Exam/Comprehensive Problem Focused Follow Up  Chief Complaint   Patient presents with    Medicare Annual Wellness Visit Subsequent     Discuss broke out rash- location was on neck  started itching (for about 4-5 weeks)   Then developed a crusty rash overnight then became red and crusty along hairline and then right eyelid (itching is persistent and stinging)  Eyelids puffy  Colored hair on Monday , rash started after    Wants to know if can have grapefruit or grapefruit juice now and then(on Crestor). Discussed taking it on days she doesn't eat GF    Skin cancer (squamous cell) removed by [facility]    Weight up and cholesterol up (in pain management- steroid shots)         Last year decreased PPI dose  Saw no difference    BP at home 120-140/60s, some lows  Takes both meds same time  home cuff: 139/81 p 61   office cuff: 138/82 p 60    Saw Ortho in 2/24 (copied)  Assessment:  73-year-old female here for follow-up of low back pain and claudication symptoms.  She did a course of physical therapy and she also got into see Dr. Cruz for pain management.  Her last gave her an epidural injection and between that and the physical therapy she is at least 80% better.  She is very happy with where she is at right now.  We had a lengthy discussion about options, she understands that if she were to ever come back and want to discuss surgery that she will meet with Dr. Whelan directly.  She also understands that there is a waiting period between epidural injections and having surgery, she is amenable to that.    Getting relief with Mobic and INNA  Is planning to do knee surgery first  Seeing Dr Lopes for this as well  Reviewed his last note (copied)  Diagnostic studies: Right hip unremarkable x-rays minimal change on MRI now with low lumbar back LS spine pain and discomfort     Impression: Steroid D[facility] PT therapy stretching program    Labs reviewed:  Component      Latest Ref Rng 5/21/2024    LEUKOCYTES (10*3/UL) IN BLOOD BY AUTOMATED COUNT, [facility]      4.4 - 11.3 x10*3/uL 5.2    nRBC      0.0 - 0.0 /100 WBCs 0.0    ERYTHROCYTES (10*6/UL) IN BLOOD BY AUTOMATED COUNT, [facility]      4.00 - 5.20 x10*6/uL 4.08    HEMOGLOBIN      12.0 - 16.0 g/dL 12.7    HEMATOCRIT      36.0 - 46.0 % 36.3    MCV      80 - 100 fL 89    MCH      26.0 - 34.0 pg 31.1    MCHC      32.0 - 36.0 g/dL 35.0    RED CELL DISTRIBUTION WIDTH      11.5 - 14.5 % 13.2    PLATELETS (10*3/UL) IN BLOOD AUTOMATED COUNT, [facility]      150 - 450 x10*3/uL 319    NEUTROPHILS/100 LEUKOCYTES IN BLOOD BY AUTOMATED COUNT, [facility]      40.0 - 80.0 % 45.8    Immature Granulocytes %, Automated      0.0 - 0.9 % 0.2    Lymphocytes %      13.0 - 44.0 % 40.2    Monocytes %      2.0 - 10.0 % 10.3    Eosinophils %      0.0 - 6.0 % 2.7    Basophils %      0.0 - 2.0 % 0.8    NEUTROPHILS (10*3/UL) IN BLOOD BY AUTOMATED COUNT, [facility]      1.60 - 5.50 x10*3/uL 2.39    Immature Granulocytes Absolute, Automated      0.00 - 0.50 x10*3/uL 0.01    Lymphocytes Absolute      0.80 - 3.00 x10*3/uL 2.10    Monocytes Absolute      0.05 - 0.80 x10*3/uL 0.54    Eosinophils Absolute      0.00 - 0.40 x10*3/uL 0.14    Basophils Absolute      0.00 - 0.10 x10*3/uL 0.04    GLUCOSE      74 - 99 mg/dL 86    SODIUM      136 - 145 mmol/L 134 (L)    POTASSIUM      3.5 - 5.3 mmol/L 4.9    CHLORIDE      98 - 107 mmol/L 99    Bicarbonate      21 - 32 mmol/L 29    Anion Gap      10 - 20 mmol/L 11    Blood Urea Nitrogen      6 - 23 mg/dL 13    Creatinine      0.50 - 1.05 mg/dL 0.66    EGFR      >60 mL/min/1.73m*2 >90    Calcium      8.6 - 10.3 mg/dL 10.0    Albumin      3.4 - 5.0 g/dL 4.7    Alkaline Phosphatase      33 - 136 U/L 73    Total Protein      6.4 - 8.2 g/dL 7.0    AST      9 - 39 U/L 27    Bilirubin Total      0.0 - 1.2 mg/dL 0.7    ALT      7 - 45 U/L 19    Color, Urine      Straw, Yellow  Yellow    Appearance, Urine      Clear  Clear    Specific Gravity, Urine      1.005 -  1.035  1.009    pH, Urine      5.0, 5.5, 6.0, 6.5, 7.0, 7.5, 8.0  7.0    Protein, Urine      NEGATIVE mg/dL NEGATIVE    Glucose, Urine      NEGATIVE mg/dL NEGATIVE    Blood, Urine      NEGATIVE  NEGATIVE    Ketones, Urine      NEGATIVE mg/dL NEGATIVE    Bilirubin, Urine      NEGATIVE  NEGATIVE    Urobilinogen, Urine      <2.0 mg/dL <2.0    Nitrite, Urine      NEGATIVE  NEGATIVE    Leukocyte Esterase, Urine      NEGATIVE  LARGE (3+) !    CHOLESTEROL      0 - 199 mg/dL 222 (H)    HDL CHOLESTEROL      mg/dL 95.4    Cholesterol/HDL Ratio 2.3    LDL Calculated      <=99 mg/dL 114 (H)    VLDL      0 - 40 mg/dL 12    TRIGLYCERIDES      0 - 149 mg/dL 62    Non HDL Cholesterol      0 - 149 mg/dL 127    Albumin, Urine Random      Not established mg/L <7.0    Creatinine, Urine Random      20.0 - 320.0 mg/dL 59.9    Albumin/Creatine Ratio --    WBC, Urine      1-5, NONE /HPF 1-5    RBC, Urine      NONE, 1-2, 3-5 /HPF NONE    Squamous Epithelial Cells, Urine      Reference range not established. /HPF 1-9 (SPARSE)    Thyroid Stimulating Hormone      0.44 - 3.98 mIU/L 1.27    Vitamin D, 25-Hydroxy, Total      30 - 100 ng/mL 42       MRI 1/24: Multilevel discogenic degenerative changes of the lumbar spine,greater at L2-3.  Multilevel bulging disc with multilevel degenerative subluxations and  dextroconvexity of the lumbar spine. These findings contribute to  mild to moderate central canal narrowing at L3-4 and L4-5. There is  multilevel neural foraminal narrowing bilaterally which is greatest  at L2-3 on the left.    XR 12/23: stable grade 1 anterolisthesis of L4  on L5 presence of spondylolisthesis. Approximate 20 degrees of  degenerative scoliotic curve noted. Multiple levels of degenerative  disc disease and ventral osteophytes seen throughout. Mild  osteoarthritic changes seen through the limited portions of the  femoroacetabular joints.    Assessment and Plan:  Problem List Items Addressed This Visit          High    Routine  adult health maintenance - Primary    Overview     Pfizer COVID-19 vaccine 2/27/21, 3/27/21, 10/6/21  Influenza vaccine 11/18/19, 10/20/20, 10/1/21, 10/1/22, 10/30/23  RSV 12/29/23  TDAP 1/24/14, 5/116/23  Shingrix 5/26/21, 8/9/21  Prevnar 7 3/27/15  Pneumovax 23 12/3/20  Zostavax 5/4/15  Mamm 6/18/18, 8/19; 9/3/20; 9/20/21; 9/22/22, 10/6/23  BMD 2005; 12/19, 12/20/21, 10/6/23  Cscope 2015 (5yrs); 1/20 (5yrs)  s/p TAHBSO  4/22/22 Current 10-Year ASCVD Risk:  21.65% - High Risk         Current Assessment & Plan     Annual Wellness exam completed   Preventive Health History reviewed  Ordered:   Labs    Mammogram   Cscope   PCV 20         Relevant Orders    Pneumococcal conjugate vaccine, 20-valent (PREVNAR 20) (Completed)       Medium    Abnormal screening cardiac CT    Overview     2018: CT calcium score =19  On statin          Current Assessment & Plan     Repeat CT  scan         Advanced directives, counseling/discussion    Overview     5/31/24: Cipriano Mckeon () is her HCPOA  FULL CODE  No extraordinary measures desired if post code QOL is not acceptable         Alcohol screening    Overview     5/31/24: 5 minutes spent screening for alcohol misuse  See snapshot (social documentation for details)  ETOH:1 daily and occasionally 2 drinks         Arthritis    Overview     Mobic prn  Careful with hx GI issues         Current Assessment & Plan     Can try Tylenol 1000mg every 8 hours          Relevant Medications    acetaminophen (Tylenol Extra Strength) 500 mg tablet    BMI 25.0-25.9,adult    Overview     Likely due to INNA/steroids  IF         Current Assessment & Plan     Can try 24hr fasting 2x/week         Cardiac risk counseling    Overview     5/31/24: Current 10-Year ASCVD Risk: 32.5% - High Risk   ASA not rec;d per updated sammi (also hx esophageal ulcer)         Encounter for screening mammogram for breast cancer    Relevant Orders    BI mammo bilateral screening tomosynthesis    GERD without  esophagitis    Overview     Controlled with PPI  EGD 10/15: LA grade C reflux esophagitis            2/16: normal         H/O retinal vein occlusion (CMS-HCC)    Overview     Sees ophtho         Hyperlipidemia    Overview     2018: CT calcium score =19  Goal LDL <100   on statin twice weekly   Used to take Tricor  AE with Zocor (tolerated Crestor once weekly);          Current Assessment & Plan     May need to increase dose  Await CT scan results         Relevant Orders    TSH with reflex to Free T4 if abnormal    Lipid Panel    CT cardiac scoring wo IV contrast    Hypertension, essential    Overview     2/2020:    home cuff: 139/81 p 61   office cuff: 138/82 p 60;   Goal BP <130/80   Lisinopril caused cough, but tolerable was d'c due to low BP with HCTZ;   HCTZ caused hyponatremia  On CCB and ARB (AE on 50mg Losartan, LH/imbalance)   Hyponatremia likely due to one of these meds         Current Assessment & Plan     Change BP meds to one in AM and one in PM         Relevant Orders    Comprehensive Metabolic Panel    CBC and Auto Differential    Urinalysis with Reflex Culture and Microscopic    CT cardiac scoring wo IV contrast    Hyponatremia    Overview     5/23: 130 while on HCTZ  5/24: 134  ? Due to ARB  Monitor         Relevant Orders    Osmolality    RESOLVED: Idiopathic osteoarthritis    Overview     Uses Mobic prn;  Monitor renal fx  Sees Ortho         Current Assessment & Plan     Can try Tylenol 1000mg every 8 hours         Intrinsic eczema    Current Assessment & Plan     Try low dose HCC BID for 1-2 weeks         Relevant Medications    hydrocortisone 1 % ointment    Lumbar disc disease    Overview     MRI 1/24: Multilevel discogenic degenerative changes of the lumbar spine, greater at L2-3.  Multilevel bulging disc with multilevel degenerative subluxations and  dextroconvexity of the lumbar spine. These findings contribute to  mild to moderate central canal narrowing at L3-4 and L4-5. There  "is  multilevel neural foraminal narrowing bilaterally which is greatest  at L2-3 on the left.  On Mobic         Current Assessment & Plan     Try Tyelnol and core work         Relevant Medications    meloxicam (Mobic) 7.5 mg tablet    Lumbar spondylosis    Overview     XR 12/23: stable grade 1 anterolisthesis of L4  on L5 presence of spondylolisthesis. Approximate 20 degrees of  degenerative scoliotic curve noted. Multiple levels of degenerative  disc disease and ventral osteophytes seen throughout. Mild  osteoarthritic changes seen through the limited portions of the  femoroacetabular joints.  On Mobic         Relevant Medications    meloxicam (Mobic) 7.5 mg tablet    Screening for colorectal cancer    Relevant Orders    Colonoscopy Screening; Average Risk Patient    Screening for multiple conditions    Overview     Depression screening completed           Vitamin D deficiency    Overview     Goal 40-50;         Relevant Orders    Vitamin D 25-Hydroxy,Total (for eval of Vitamin D levels)     Other Visit Diagnoses       Screening for cardiovascular condition        Relevant Orders    CT cardiac scoring wo IV contrast              ROS otherwise negative aside from what was mentioned above in HPI.    Vitals  /70   Pulse 70   Temp 35.8 °C (96.5 °F)   Ht 1.6 m (5' 3\")   Wt 64.6 kg (142 lb 8 oz)   SpO2 95%   BMI 25.24 kg/m²   Body mass index is 25.24 kg/m².  Physical Exam  Gen: Alert, NAD  HEENT:  Unremarkable  Neck:  No RODNEY  Respiratory:  Lungs CTAB  Cardiovascular:  Heart RRR  Neuro:  Gross motor and sensory intact  Skin:  No suspicious lesions present, raised irritated erythehmatous skin on the right upper eyelifd  Breast: No masses, or axillary lymphadenopathy    Patient Care Team:  Ellie Garcia MD as PCP - General (Internal Medicine)  Ellie Garcia MD as PCP - [employer] Advantage PCP  Jasson Lopes MD as Consulting Physician (Orthopaedic Surgery)  Sola Peter DO as Consulting Physician " (Dermatology)  Daniel Donovan OD as Physician Assistant (Optometry)  Sanjay Queen MD as Referring Physician (Ophthalmology)     Activities of Daily Living  In your present state of health, do you have any difficulty performing the following activities?:   Preparing food and eating?: No  Bathing yourself: No  Getting dressed: No  Using the toilet:No  Moving around from place to place: No  In the past year have you fallen or had a near fall?:No  Able to manage finances independently: Yes  Able to perform grocery shopping: Yes  Able to manage medications independently: Yes  Able to do housework independently: Yes  Patient self-assessment of health status? Excellent    Current exercise habits: 3 times a week aerobics and 6 days a week walking(2- 3.5 miles )  Dietary issues discussed: Yes  Hearing difficulties: Yes  Safe in current home environment: Yes  Visual Acuity assessed: No  Cognitive Impairment No  Allergies and Medications  Atorvastatin, Hydrochlorothiazide, Hydrocodone-acetaminophen, Indomethacin, and Lisinopril  Current Outpatient Medications   Medication Instructions    acetaminophen (TYLENOL EXTRA STRENGTH) 1,000 mg, oral, Every 8 hours PRN    amLODIPine (NORVASC) 2.5 mg, oral, Daily    ascorbic acid (VITAMIN C) 500 mg, oral, Daily    Bacillus subtilis-inulin 1.5 billion cell-1 gram tablet,chewable Bacillus subtilis-inulin 1.5 billion cell-1 gram tablet,chewable Chew. 0 02/17/2021 Active    calcium carbonate-vitamin D3 500 mg-3.125 mcg (125 unit) tablet tablet 1 tablet, oral, Daily    cetirizine (ZYRTEC) 10 mg, oral, Daily    cyanocobalamin (VITAMIN B-12) 100 mcg, oral, Daily    hydrocortisone 1 % ointment Topical, 2 times daily    latanoprost (Xalatan) 0.005 % ophthalmic solution 1 drop, Both Eyes, Nightly    losartan (COZAAR) 25 mg, oral, Daily    meloxicam (MOBIC) 7.5 mg, oral, 2 times daily, Bid    multivitamin tablet 1 tablet, oral, Daily    pantoprazole (PROTONIX) 20 mg, oral, Daily before  breakfast, Do not crush, chew, or split.     rosuvastatin (Crestor) 10 mg tablet TAKE 1 TABLET TWICE WEEKLY       Medications and Supplements  prescribed by me and other practitioners or clinical pharmacist (such as prescriptions, OTC's, herbal therapies and supplements) were reviewed and documented in the medical record.      Active Problem List  Patient Active Problem List   Diagnosis    Routine adult health maintenance    Advanced directives, counseling/discussion    Cardiac risk counseling    Screening for multiple conditions    Abnormal screening cardiac CT    Allergic rhinitis    Arthritis    TMJ (temporomandibular joint disorder)    Disorder of bone density and structure, unspecified    Diverticulosis of colon    GERD without esophagitis    Hearing loss, bilateral    Hyperlipidemia    Hypertension, essential    Hyponatremia    Venous stasis of lower extremity    Vitamin D deficiency    H/O esophagitis    H/O esophageal ulcer    Cataract    Raynaud's phenomenon without gangrene    Alcohol screening    H/O nonmelanoma skin cancer    BMI 25.0-25.9,adult    Intrinsic eczema    Lumbar disc disease    Lumbar spondylosis    H/O retinal vein occlusion (CMS-HCC)    Encounter for screening mammogram for breast cancer    Screening for colorectal cancer       Comprehensive Medical/Surgical/Social/Family History  Past Medical History:   Diagnosis Date    Abnormal screening cardiac CT     2018: CT calcium score =19    H/O bone density study     12/21: T score- 2.0 FRAX* 10-year Probability of Fracture Based on femoral neck BMD: DualFemur (Left) Major Osteoporotic Fracture: 19.5% Hip Fracture:                4.3% 10/16: -1.8 12/19: Ten Year Major Osteoporotic Fracture Risk: 10.42%  Ten Year Hip Fracture Risk: 1.76%  T-Score: -1.8    H/O bone density study 10/06/2023    Lowest T score -1.9. 10-year Fracture Risk: Major Osteoporotic Fracture  12.2% Hip Fracture                        2.9%    H/O CT scan of abdomen     4/22:  1. Copious feces. 2. Diverticulosis. 3. Nonspecific fairly mild gastric through proximal jejunal distension. 4. Spinal degenerative changes include marked lower lumbar facet joint DJD accounting for grade I/IV spondylolisthesis of L4 on L5. Mild scoliosis. 5. A 1.6 cm wide umbilical hernia contains only fat. 6. Diastasis recti.    H/O magnetic resonance imaging of lumbar spine 05/31/2024    Multilevel discogenic degenerative changes of the lumbar spine, greater at L2-3.    Multilevel bulging disc with multilevel degenerative subluxations and dextroconvexity of the lumbar spine. These findings contribute to mild to moderate central canal narrowing at L3-4 and L4-5. There is multilevel neural foraminal narrowing bilaterally which is greatest at L2-3 on the left.    H/O x-ray of lumbar spine 12/19/2023    stable grade 1 anterolisthesis of L4 on L5 presence of spondylolisthesis. Approximate 20 degrees of degenerative scoliotic curve noted. Multiple levels of degenerative disc disease and ventral osteophytes seen throughout. Mild osteoarthritic changes seen through the limited portions of the femoroacetabular joints.     Past Surgical History:   Procedure Laterality Date    BLADDER SUSPENSION  11/18/2019    CARDIAC CATHETERIZATION  11/18/2019 2010 - Normal    COLONOSCOPY  01/30/2020    1/15: diverticulosis (5yrs) 2009:mild diverticulosis sigmoid (7yrs) 1/20: diverticulosis    ESOPHAGOGASTRODUODENOSCOPY  11/19/2019    10/15: LA grade C reflux esophagitis 2/16: normal    FL GUIDED ASPIRATION OR INJECTION LARGE JOINT LEFT Left 12/11/2023    FL GUIDED ASPIRATION OR INJECTION LARGE JOINT LEFT 12/11/2023 MD RUPAL Ryder    OTHER SURGICAL HISTORY  11/06/2018    Oral surgery    OTHER SURGICAL HISTORY  11/06/2018    Tonsillectomy    OTHER SURGICAL HISTORY  11/06/2018    Foot surgery    OTHER SURGICAL HISTORY  11/06/2018    Rotator cuff repair    OTHER SURGICAL HISTORY  09/23/2019    [place] tooth  extraction    OTHER SURGICAL HISTORY  2019    Appendectomy    OTHER SURGICAL HISTORY  2019    Carpal tunnel surgery    OTHER SURGICAL HISTORY  2019    Colposcopy    TOTAL ABDOMINAL HYSTERECTOMY W/ BILATERAL SALPINGOOPHORECTOMY  2019    Hysterectomy    TOTAL KNEE ARTHROPLASTY  2019    Right - 1-     Social History     Social History Narrative    , 3 kids, 2 stepkids    Retired     Nonsmoker    ETOH:1 daily and occasionally 2 drinks    ---    Family History:    MCousin: Breast Cancer       F:  CAD (CABG age 61), HTN, HPL, Bile Duct Cancer ( 67)                    M:   Coronary Artery Disease/CABG age 69, HTN, HPL ( age 89)            B: HPL, HTN    B: HPL, CAD/PTCA, Cataract    S: HTN, Cataracts    S: Cataracts    Cousin:  leukemia              Tobacco/Alcohol/Opioid use, as well as Illicit Drug Use was screened for/reviewed and documented in Social Documentation section of the chart and medication list as appropriate   (~5min spent discussing the above)    Depression Screening  Depression screening completed using the PHQ-2 questions, with results documented in the chart/encounter (5 min spent on this).  (See Rooming Screening section for documentation, and/or progress note for additional information)    Cardiac Risk Assessment (15 minutes spent on this)  The 10-year ASCVD risk score (Geraldo ABEL, et al., 2019) is: 19%    Values used to calculate the score:      Age: 73 years      Sex: Female      Is Non- : No      Diabetic: No      Tobacco smoker: No      Systolic Blood Pressure: 128 mmHg      Is BP treated: Yes      HDL Cholesterol: 95.4 mg/dL      Total Cholesterol: 222 mg/dL    Cardiovascular risk was discussed and, if needed, lifestyle modifications recommended, including nutritional choices, exercise, and elimination of habits contributing to risk. We agreed on a plan to reduce the current cardiovascular risk based on above  discussion as needed.     Aspirin use/disuse was discussed and documented in the Problem List of the medical record (under Cardiac Risk Counseling) after reviewing the updated guidelines below:  Consider low dose Aspirin ( mg) use if the benefit for cardiovascular disease prevention outweighs risk for bleeding complications.   Discussed that in general, low dose ASA should be considered:  In patients WITHOUT prior MI/stroke/PAD (primary prevention):   a. Age <60: Use if 10-year cardiovascular disease risk >20%, with discussion of risks and benefits with patient  b. Age 60-<70: Use if 10-year cardiovascular disease risk >20% and low bleeding (e.g., gastrointestinal) risk, with discussion of risks and benefits with patient  c. Age >=70: Do not use    In patients WITH prior MI/stroke/PAD (secondary prevention):   Generally use unless extremely high bleeding (e.g., gastrointenstinal) risk, with discussion of risks and benefits with patient    Advance Directives Discussion  Advanced Care Planning (including a Living Will, Healthcare POA, as well as specific end of life choices and/or directives), was discussed with the patient and/or surrogate, voluntarily, and details of that discussion documented in the Problem List (under Advanced Directives Discussion) of the medical record.   (16 min spent discussing above)     During the course of the visit the patient was educated and counseled about age appropriate screening and preventive services.   Completed preventive screenings were documented in the chart (see Routine Health Maintenance in Problem List) and orders were placed for outstanding screenings/procedures as documented in the Assessment and Plan.    Patient Instructions (the written plan) was given to the patient at check out.

## 2024-05-31 NOTE — ASSESSMENT & PLAN NOTE
Annual Wellness exam completed   Preventive Health History reviewed  Ordered:   Labs    Mammogram   Cscope   PCV 20

## 2024-06-06 ENCOUNTER — OFFICE VISIT (OUTPATIENT)
Dept: ORTHOPEDIC SURGERY | Facility: CLINIC | Age: 74
End: 2024-06-06
Payer: MEDICARE

## 2024-06-06 ENCOUNTER — PATIENT MESSAGE (OUTPATIENT)
Dept: PRIMARY CARE | Facility: CLINIC | Age: 74
End: 2024-06-06
Payer: MEDICARE

## 2024-06-06 ENCOUNTER — HOSPITAL ENCOUNTER (OUTPATIENT)
Dept: RADIOLOGY | Facility: CLINIC | Age: 74
Discharge: HOME | End: 2024-06-06
Payer: MEDICARE

## 2024-06-06 DIAGNOSIS — M25.562 LEFT KNEE PAIN, UNSPECIFIED CHRONICITY: ICD-10-CM

## 2024-06-06 DIAGNOSIS — E78.5 HYPERLIPIDEMIA, UNSPECIFIED HYPERLIPIDEMIA TYPE: ICD-10-CM

## 2024-06-06 DIAGNOSIS — M17.12 PRIMARY OSTEOARTHRITIS OF LEFT KNEE: Primary | ICD-10-CM

## 2024-06-06 PROCEDURE — 2500000005 HC RX 250 GENERAL PHARMACY W/O HCPCS: Performed by: ORTHOPAEDIC SURGERY

## 2024-06-06 PROCEDURE — 73560 X-RAY EXAM OF KNEE 1 OR 2: CPT | Mod: LEFT SIDE | Performed by: ORTHOPAEDIC SURGERY

## 2024-06-06 PROCEDURE — 1123F ACP DISCUSS/DSCN MKR DOCD: CPT | Performed by: ORTHOPAEDIC SURGERY

## 2024-06-06 PROCEDURE — 3008F BODY MASS INDEX DOCD: CPT | Performed by: ORTHOPAEDIC SURGERY

## 2024-06-06 PROCEDURE — 20610 DRAIN/INJ JOINT/BURSA W/O US: CPT | Mod: LT | Performed by: ORTHOPAEDIC SURGERY

## 2024-06-06 PROCEDURE — 99214 OFFICE O/P EST MOD 30 MIN: CPT | Mod: 25 | Performed by: ORTHOPAEDIC SURGERY

## 2024-06-06 PROCEDURE — 2500000004 HC RX 250 GENERAL PHARMACY W/ HCPCS (ALT 636 FOR OP/ED): Performed by: ORTHOPAEDIC SURGERY

## 2024-06-06 PROCEDURE — 99214 OFFICE O/P EST MOD 30 MIN: CPT | Performed by: ORTHOPAEDIC SURGERY

## 2024-06-06 PROCEDURE — 73560 X-RAY EXAM OF KNEE 1 OR 2: CPT | Mod: LT

## 2024-06-06 RX ORDER — BETAMETHASONE SODIUM PHOSPHATE AND BETAMETHASONE ACETATE 3; 3 MG/ML; MG/ML
2 INJECTION, SUSPENSION INTRA-ARTICULAR; INTRALESIONAL; INTRAMUSCULAR; SOFT TISSUE
Status: COMPLETED | OUTPATIENT
Start: 2024-06-06 | End: 2024-06-06

## 2024-06-06 RX ORDER — LIDOCAINE HYDROCHLORIDE 10 MG/ML
5 INJECTION INFILTRATION; PERINEURAL
Status: COMPLETED | OUTPATIENT
Start: 2024-06-06 | End: 2024-06-06

## 2024-06-06 RX ADMIN — LIDOCAINE HYDROCHLORIDE 5 ML: 10 INJECTION, SOLUTION INFILTRATION; PERINEURAL at 14:24

## 2024-06-06 RX ADMIN — BETAMETHASONE SODIUM PHOSPHATE AND BETAMETHASONE ACETATE 2 ML: 3; 3 INJECTION, SUSPENSION INTRA-ARTICULAR; INTRALESIONAL; INTRAMUSCULAR at 14:24

## 2024-06-06 NOTE — PROGRESS NOTES
History of present illness: New problem left knee giving her more pain and discomfort she has known osteoarthritis no recent traumatic event    Physical exam:    General: No acute distress or breathing difficulty or discomfort, pleasant and cooperative with the examination.    Extremities: The affected left knee was examined and inspected and was tender to touch along the medial and lateral aspect with catching, locking or mechanical symptoms.    The skin was intact without breakdown or open wound.  Old incisions of present were healed.    There was a mild Ki exam seen with some evidence of instability and weakness in the collateral ligaments with varus valgus stressing and laxity in the anterior or posterior planes.    There was a negative Lachman's test pivot shift test and posterior drawer sign with no foot drop, numbness or tingling.    Sensation, reflexes and pulses in the foot and ankle are preserved.  There was an effusion.  Range of motion showed good straight leg raise with flexion to 150 degrees and extension to 0 degrees.  The patient had the ability to bear weight but with discomfort.  The patient's gait was antalgic secondary to discomfort.    Before aspiration injection the benefits of a cortisone injection including infection, local skin irritation, skin atrophy, calcification, continued pain and discomfort, elevated blood sugar, burning, failure to relieve pain, possible late infection were discussed with the patient.    Postprocedure discomfort can be alleviated with additional medications, ice, elevation, rest over the first 24 hours as recommended.    Patient verbalized understanding and wanted to proceed with the planned procedure.    After informed consent was provided and allergies verified, the patient was positioned appropriately on the bed.  The left knee to be aspirated and injected was prepped and draped in a sterile fashion.  The skin was anesthetized with ethyl chloride spray.  A  joint aspiration was to be performed an 18-gauge needle was used otherwise a 22-gauge needle was used to inject the appropriate joint.    Joint injection was performed with a mixture of 5 cc 1% lidocaine plain and 2 cc Celestone Soluspan 6 mg per mL.  The needle was removed and the puncture site closed and sealed with a Band-Aid.  The patient tolerated the procedure well.          Diagnostic studies: X-rays show medial and advanced patellofemoral arthrosis left knee    Impression: Left knee advanced arthritis more severe under the patellofemoral joint    Plan: Injection ice elevate she already has an AFO brace on the leg so she is not amenable to more bracing I will see her back in September 2024 for simple knee injection she is trying to get away with scheduling the knee replacement later in 2025    If the cortisone injection provided they does not provide lasting relief she will call my  to order gel shots  L Inj/Asp: L knee on 6/6/2024 2:24 PM  Indications: pain and diagnostic evaluation  Details: 22 G needle, medial approach  Medications: 2 mL betamethasone acet,sod phos 6 mg/mL; 5 mL lidocaine 10 mg/mL (1 %)  Outcome: tolerated well, no immediate complications  Procedure, treatment alternatives, risks and benefits explained, specific risks discussed. Consent was given by the patient. Immediately prior to procedure a time out was called to verify the correct patient, procedure, equipment, support staff and site/side marked as required. Patient was prepped and draped in the usual sterile fashion.

## 2024-06-06 NOTE — TELEPHONE ENCOUNTER
From: Carly Cheung  To: Johanna Harmon  Sent: 2024 12:36 PM EDT  Subject: Prescription renewal request    Johanna Rangel  Hope all continues well you you and your family…and also at the office!    I saw Dr. Garcia on 2024, but neglected to ask for a renewal of Rosuvastatin Calcium 10mg tablets.   I take 2 tablets WEEKLY, and am covered for about 8 weeks, but would you please issue a renewal of this prescription to [facility]?   Thank you,    Carly Cheung   [phone]  : 1950

## 2024-06-07 RX ORDER — ROSUVASTATIN CALCIUM 10 MG/1
TABLET, COATED ORAL
Qty: 24 TABLET | Refills: 3 | Status: SHIPPED | OUTPATIENT
Start: 2024-06-07

## 2024-07-02 ENCOUNTER — HOSPITAL ENCOUNTER (OUTPATIENT)
Dept: RADIOLOGY | Facility: CLINIC | Age: 74
Discharge: HOME | End: 2024-07-02
Payer: MEDICARE

## 2024-07-02 DIAGNOSIS — E78.2 MIXED HYPERLIPIDEMIA: ICD-10-CM

## 2024-07-02 DIAGNOSIS — Z13.6 SCREENING FOR CARDIOVASCULAR CONDITION: ICD-10-CM

## 2024-07-02 DIAGNOSIS — I10 HYPERTENSION, ESSENTIAL: ICD-10-CM

## 2024-07-02 PROCEDURE — 75571 CT HRT W/O DYE W/CA TEST: CPT

## 2024-07-03 ENCOUNTER — TELEPHONE (OUTPATIENT)
Dept: PRIMARY CARE | Facility: CLINIC | Age: 74
End: 2024-07-03
Payer: MEDICARE

## 2024-07-03 DIAGNOSIS — E78.5 HYPERLIPIDEMIA, UNSPECIFIED HYPERLIPIDEMIA TYPE: ICD-10-CM

## 2024-07-03 DIAGNOSIS — E78.2 HYPERLIPIDEMIA, MIXED: ICD-10-CM

## 2024-07-03 RX ORDER — ROSUVASTATIN CALCIUM 10 MG/1
TABLET, COATED ORAL
Qty: 24 TABLET | Refills: 3 | Status: SHIPPED | OUTPATIENT
Start: 2024-07-03

## 2024-07-03 NOTE — TELEPHONE ENCOUNTER
----- Message from Ellie Garcia MD sent at 7/3/2024 12:56 PM EDT -----  Didn't realize that  Just go to 3x/week  Repeat in 6-8 weeks ALT and lipids   ----- Message -----  From: Reina Kenyon MA  Sent: 7/3/2024  11:32 AM EDT  To: Ellie Garcia MD    Relayed to patient states she's only on  10mg twice weekly   States that's a big jump can she do like 10mg daily ?   She's going to a class , okay to leave detailed vm with pcp response.

## 2024-08-13 ENCOUNTER — LAB (OUTPATIENT)
Dept: LAB | Facility: LAB | Age: 74
End: 2024-08-13
Payer: MEDICARE

## 2024-08-13 DIAGNOSIS — E78.2 HYPERLIPIDEMIA, MIXED: ICD-10-CM

## 2024-08-13 LAB
ALT SERPL W P-5'-P-CCNC: 23 U/L (ref 7–45)
CHOLEST SERPL-MCNC: 174 MG/DL (ref 0–199)
CHOLESTEROL/HDL RATIO: 2.1
HDLC SERPL-MCNC: 83.1 MG/DL
LDLC SERPL CALC-MCNC: 78 MG/DL
NON HDL CHOLESTEROL: 91 MG/DL (ref 0–149)
TRIGL SERPL-MCNC: 65 MG/DL (ref 0–149)
VLDL: 13 MG/DL (ref 0–40)

## 2024-08-13 PROCEDURE — 36415 COLL VENOUS BLD VENIPUNCTURE: CPT

## 2024-08-13 PROCEDURE — 84460 ALANINE AMINO (ALT) (SGPT): CPT

## 2024-08-13 PROCEDURE — 80061 LIPID PANEL: CPT

## 2024-08-21 DIAGNOSIS — E78.5 HYPERLIPIDEMIA, UNSPECIFIED HYPERLIPIDEMIA TYPE: ICD-10-CM

## 2024-08-22 RX ORDER — ROSUVASTATIN CALCIUM 10 MG/1
TABLET, COATED ORAL
Qty: 24 TABLET | Refills: 1 | Status: SHIPPED | OUTPATIENT
Start: 2024-08-22

## 2024-09-12 ENCOUNTER — APPOINTMENT (OUTPATIENT)
Dept: ORTHOPEDIC SURGERY | Facility: CLINIC | Age: 74
End: 2024-09-12
Payer: MEDICARE

## 2024-09-12 DIAGNOSIS — M17.12 PRIMARY OSTEOARTHRITIS OF LEFT KNEE: Primary | ICD-10-CM

## 2024-09-12 PROCEDURE — 20610 DRAIN/INJ JOINT/BURSA W/O US: CPT | Mod: LT | Performed by: ORTHOPAEDIC SURGERY

## 2024-09-12 PROCEDURE — 2500000004 HC RX 250 GENERAL PHARMACY W/ HCPCS (ALT 636 FOR OP/ED): Mod: JZ | Performed by: ORTHOPAEDIC SURGERY

## 2024-09-12 PROCEDURE — 1123F ACP DISCUSS/DSCN MKR DOCD: CPT | Performed by: ORTHOPAEDIC SURGERY

## 2024-09-12 PROCEDURE — 99211 OFF/OP EST MAY X REQ PHY/QHP: CPT | Mod: 25 | Performed by: ORTHOPAEDIC SURGERY

## 2024-09-12 NOTE — PROGRESS NOTES
Before the left injection the benefits of a hyaluronic acid  injection including infection, local skin irritation, skin atrophy, calcification, continued pain and discomfort, elevated blood sugar, burning, failure to relieve pain, possible late infection were discussed with the patient.    Postprocedure discomfort can be alleviated with additional medications, ice, elevation, rest over the first 24 hours as recommended.    Patient verbalized understanding and wanted to proceed with the planned procedure.    After informed consent was provided and allergies verified, the patient was positioned appropriately on the bed.  The LEFT KNEE to be injected was prepped and draped in a sterile fashion.  The skin was anesthetized with ethyl chloride spray.   A  22-gauge needle was used to inject the appropriate joint.    Joint injection was performed with Durolane injection containing 60 mg of hyaluronic acid per 3 mL was performed.  The needle was removed and the puncture site closed and sealed with a Band-Aid.  The patient tolerated the procedure well.          The patient was interested in scheduling her left knee replacement in January 2025 routine surgical risk discussed rehab discussed options of nonsurgical treatment to be continued or discussed    She had a successful right knee replacement years ago    She like to have the surgery done at our  surgery center    Risk and benefits of surgery discussed extensively with the patient.    Surgical risk included but were not limited to infection, wear, loosening, need for further surgery blood clot, failure to heal, failure of the surgery, stiffness, loss of limb life, extremity function change in length change, and associated risks of  any surgery.    L Inj/Asp: L knee on 9/12/2024 9:39 AM  Indications: pain  Details: 22 G needle, medial approach  Medications: 3 mL sodium hyaluronate 60 mg/3 mL  Outcome: tolerated well, no immediate complications  Procedure, treatment  alternatives, risks and benefits explained, specific risks discussed. Consent was given by the patient. Immediately prior to procedure a time out was called to verify the correct patient, procedure, equipment, support staff and site/side marked as required. Patient was prepped and draped in the usual sterile fashion.

## 2024-10-08 ENCOUNTER — HOSPITAL ENCOUNTER (OUTPATIENT)
Dept: RADIOLOGY | Facility: CLINIC | Age: 74
Discharge: HOME | End: 2024-10-08
Payer: MEDICARE

## 2024-10-08 DIAGNOSIS — Z12.31 ENCOUNTER FOR SCREENING MAMMOGRAM FOR BREAST CANCER: ICD-10-CM

## 2024-10-08 PROCEDURE — 77067 SCR MAMMO BI INCL CAD: CPT | Performed by: RADIOLOGY

## 2024-10-08 PROCEDURE — 77067 SCR MAMMO BI INCL CAD: CPT

## 2024-10-08 PROCEDURE — 77063 BREAST TOMOSYNTHESIS BI: CPT | Performed by: RADIOLOGY

## 2024-11-21 ENCOUNTER — APPOINTMENT (OUTPATIENT)
Dept: URGENT CARE | Age: 74
End: 2024-11-21
Payer: MEDICARE

## 2024-11-23 ENCOUNTER — OFFICE VISIT (OUTPATIENT)
Dept: URGENT CARE | Age: 74
End: 2024-11-23
Payer: MEDICARE

## 2024-11-23 VITALS
RESPIRATION RATE: 16 BRPM | WEIGHT: 142 LBS | HEART RATE: 65 BPM | DIASTOLIC BLOOD PRESSURE: 87 MMHG | TEMPERATURE: 97.7 F | HEIGHT: 63 IN | OXYGEN SATURATION: 98 % | BODY MASS INDEX: 25.16 KG/M2 | SYSTOLIC BLOOD PRESSURE: 175 MMHG

## 2024-11-23 DIAGNOSIS — N30.00 ACUTE CYSTITIS WITHOUT HEMATURIA: Primary | ICD-10-CM

## 2024-11-23 LAB
POC APPEARANCE, URINE: ABNORMAL
POC BILIRUBIN, URINE: NEGATIVE
POC BLOOD, URINE: NEGATIVE
POC COLOR, URINE: YELLOW
POC GLUCOSE, URINE: NEGATIVE MG/DL
POC KETONES, URINE: NEGATIVE MG/DL
POC LEUKOCYTES, URINE: ABNORMAL
POC NITRITE,URINE: POSITIVE
POC PH, URINE: 6 PH
POC PROTEIN, URINE: ABNORMAL MG/DL
POC SPECIFIC GRAVITY, URINE: 1.01
POC UROBILINOGEN, URINE: 4 EU/DL

## 2024-11-23 PROCEDURE — 87086 URINE CULTURE/COLONY COUNT: CPT

## 2024-11-23 RX ORDER — NITROFURANTOIN 25; 75 MG/1; MG/1
100 CAPSULE ORAL 2 TIMES DAILY
Qty: 10 CAPSULE | Refills: 0 | Status: SHIPPED | OUTPATIENT
Start: 2024-11-23 | End: 2024-11-28

## 2024-11-23 ASSESSMENT — ENCOUNTER SYMPTOMS
FREQUENCY: 1
FATIGUE: 0
HEMATURIA: 0
CHILLS: 0
ABDOMINAL PAIN: 0
FLANK PAIN: 0
VOMITING: 0
NAUSEA: 0
FEVER: 0
DYSURIA: 1

## 2024-11-23 NOTE — PROGRESS NOTES
"Subjective   Patient ID: Carly Cheung is a 74 y.o. female. They present today with a chief complaint of Urinary Frequency (Foul smell, urgency, different color urine. ).    History of Present Illness  Subjective  Carly Cheung is a 74 y.o. female who complains of abnormal smelling urine, frequency, and urgency for 1 week. Patient does not have a history of recurrent UTI or pyelonephritis.    [unfilled]    Objective  /87 Comment: Pt takes BP meds, took today.  Pulse 65   Temp 36.5 °C (97.7 °F) (Temporal)   Resp 16   Ht 1.6 m (5' 3\")   Wt 64.4 kg (142 lb)   SpO2 98%   BMI 25.15 kg/m²    General: alert and oriented, in no acute distress  Abdomen: soft, non-tender, without masses or organomegaly  Back: CVA tenderness absent    Laboratory:   Urine dipstick shows +leukocytes, +nitrites, +protein.        Assessment/Plan  Diagnoses and associated orders for this visit:    · Acute cystitis without hematuria   POCT UA Automated manually resulted   nitrofurantoin, macrocrystal-monohydrate, (Macrobid) 100 mg capsule; Take 1 capsule (100 mg) by mouth 2 times a day for 5 days.   Urine Culture            History provided by:  Patient   used: No    Urinary Frequency  Associated symptoms: no abdominal pain, no fatigue, no fever, no nausea and no vomiting        Past Medical History  Allergies as of 11/23/2024 - Reviewed 11/23/2024   Allergen Reaction Noted    Atorvastatin Unknown 05/16/2023    Hydrochlorothiazide Unknown 06/08/2023    Hydrocodone-acetaminophen Nausea And Vomiting, Other, and Unknown 12/14/2012    Indomethacin Hallucinations and Other 08/19/2009    Lisinopril Cough 05/16/2023       (Not in a hospital admission)       Past Medical History:   Diagnosis Date    Abnormal screening cardiac CT     2018: CT calcium score =19    Abnormal screening cardiac CT 07/03/2024    1. Coronary artery calcium score of 97(LAD). 2. JEFFREY 62nd percentile** for age, gender, and race in asymptomatic " patients.    H/O bone density study     12/21: T score- 2.0 FRAX* 10-year Probability of Fracture Based on femoral neck BMD: DualFemur (Left) Major Osteoporotic Fracture: 19.5% Hip Fracture:                4.3% 10/16: -1.8 12/19: Ten Year Major Osteoporotic Fracture Risk: 10.42%  Ten Year Hip Fracture Risk: 1.76%  T-Score: -1.8    H/O bone density study 10/06/2023    Lowest T score -1.9. 10-year Fracture Risk: Major Osteoporotic Fracture  12.2% Hip Fracture                        2.9%    H/O CT scan of abdomen     4/22: 1. Copious feces. 2. Diverticulosis. 3. Nonspecific fairly mild gastric through proximal jejunal distension. 4. Spinal degenerative changes include marked lower lumbar facet joint DJD accounting for grade I/IV spondylolisthesis of L4 on L5. Mild scoliosis. 5. A 1.6 cm wide umbilical hernia contains only fat. 6. Diastasis recti.    H/O magnetic resonance imaging of lumbar spine 05/31/2024    Multilevel discogenic degenerative changes of the lumbar spine, greater at L2-3.    Multilevel bulging disc with multilevel degenerative subluxations and dextroconvexity of the lumbar spine. These findings contribute to mild to moderate central canal narrowing at L3-4 and L4-5. There is multilevel neural foraminal narrowing bilaterally which is greatest at L2-3 on the left.    H/O x-ray of lumbar spine 12/19/2023    stable grade 1 anterolisthesis of L4 on L5 presence of spondylolisthesis. Approximate 20 degrees of degenerative scoliotic curve noted. Multiple levels of degenerative disc disease and ventral osteophytes seen throughout. Mild osteoarthritic changes seen through the limited portions of the femoroacetabular joints.       Past Surgical History:   Procedure Laterality Date    BLADDER SUSPENSION  11/18/2019    CARDIAC CATHETERIZATION  11/18/2019 2010 - Normal    COLONOSCOPY  01/30/2020    1/15: diverticulosis (5yrs) 2009:mild diverticulosis sigmoid (7yrs) 1/20: diverticulosis     "ESOPHAGOGASTRODUODENOSCOPY  11/19/2019    10/15: LA grade C reflux esophagitis 2/16: normal    FL GUIDED ASPIRATION OR INJECTION LARGE JOINT LEFT Left 12/11/2023    FL GUIDED ASPIRATION OR INJECTION LARGE JOINT LEFT 12/11/2023 Jose C Yoo MD [facility]    OTHER SURGICAL HISTORY  11/06/2018    Oral surgery    OTHER SURGICAL HISTORY  11/06/2018    Tonsillectomy    OTHER SURGICAL HISTORY  11/06/2018    Foot surgery    OTHER SURGICAL HISTORY  11/06/2018    Rotator cuff repair    OTHER SURGICAL HISTORY  09/23/2019    [place] tooth extraction    OTHER SURGICAL HISTORY  09/23/2019    Appendectomy    OTHER SURGICAL HISTORY  09/23/2019    Carpal tunnel surgery    OTHER SURGICAL HISTORY  09/23/2019    Colposcopy    TOTAL ABDOMINAL HYSTERECTOMY W/ BILATERAL SALPINGOOPHORECTOMY  11/18/2019    Hysterectomy    TOTAL KNEE ARTHROPLASTY  08/19/2019    Right - 1-2018        reports that she has never smoked. She has never used smokeless tobacco. She reports current alcohol use. She reports that she does not use drugs.    Review of Systems  Review of Systems   Constitutional:  Negative for chills, fatigue and fever.   Gastrointestinal:  Negative for abdominal pain, nausea and vomiting.   Genitourinary:  Positive for dysuria, frequency and urgency. Negative for decreased urine volume, flank pain, hematuria and pelvic pain.                                  Objective    Vitals:    11/23/24 1104   BP: 175/87   Pulse: 65   Resp: 16   Temp: 36.5 °C (97.7 °F)   TempSrc: Temporal   SpO2: 98%   Weight: 64.4 kg (142 lb)   Height: 1.6 m (5' 3\")     No LMP recorded. Patient has had a hysterectomy.    Physical Exam  Vitals and nursing note reviewed.   Constitutional:       General: She is not in acute distress.     Appearance: Normal appearance. She is normal weight. She is not ill-appearing, toxic-appearing or diaphoretic.   Cardiovascular:      Rate and Rhythm: Normal rate and regular rhythm.      Pulses: Normal pulses.      Heart " sounds: Normal heart sounds.   Pulmonary:      Effort: Pulmonary effort is normal.      Breath sounds: Normal breath sounds.   Abdominal:      General: Abdomen is flat. Bowel sounds are normal. There is no distension.      Palpations: Abdomen is soft.      Tenderness: There is no abdominal tenderness.   Skin:     General: Skin is warm and dry.      Coloration: Skin is not jaundiced or pale.      Findings: No bruising or erythema.   Neurological:      General: No focal deficit present.      Mental Status: She is alert.         Procedures    Point of Care Test & Imaging Results from this visit  Results for orders placed or performed in visit on 11/23/24   POCT UA Automated manually resulted   Result Value Ref Range    POC Color, Urine Yellow Straw, Yellow, Light-Yellow    POC Appearance, Urine Cloudy (A) Clear    POC Glucose, Urine NEGATIVE NEGATIVE mg/dl    POC Bilirubin, Urine NEGATIVE NEGATIVE    POC Ketones, Urine NEGATIVE NEGATIVE mg/dl    POC Specific Gravity, Urine 1.015 1.005 - 1.035    POC Blood, Urine NEGATIVE NEGATIVE    POC PH, Urine 6.0 No Reference Range Established PH    POC Protein, Urine 30 (1+) NEGATIVE, 30 (1+) mg/dl    POC Urobilinogen, Urine 4.0 (A) 0.2, 1.0 EU/DL    Poc Nitrite, Urine POSITIVE (A) NEGATIVE    POC Leukocytes, Urine LARGE (3+) (A) NEGATIVE      No results found.    Diagnostic study results (if any) were reviewed by SERGIO Vazquez.    Assessment/Plan   Allergies, medications, history, and pertinent labs/EKGs/Imaging reviewed by SERGIO Vazquez.     Medical Decision Making    Urgent Care Course:  Patient presents to the  with dysuria, urinary frequency.  Patient is afebrile, hemodynamically stable.  Pt denies fever, n/v, cp, sob, hematuria, rash, and diarrhea.  Abdomen is soft, non tender, non acute.  No rebound tenderness, guarding, distention, or CVA tenderness.   Patient believes that they have a UTI.  UA shows leukocytes, nitrites.  Will send urine for  culture.   Patient educated about UTIs.  Patient given prescription for Macrobid, given UTI warning signs and will f/u with pcp.   Prior external records reviewed: Outpatient records  Reviewed pertinent findings from resulted labs:  UA - UTI  Independent Hx provided by:  Patient  Pt's case/impression summarized and discussed with:  Patient  Likely Dx given clinical picture:  UTI   Although not an exhaustive list of Differential Diagnosis (though considered), patient's HPI, PE, and other findings are not suggestive of:  pyelonephritis, kidney stone, urethral stricture, bladder spasms, bladder cancer   Patient at time of discharge was clinically well-appearing and HDS for outpatient management. The patient and/or family was given the opportunity to ask questions prior to discharge, understood my verbal discussion of the plans for treatment, expected course, indications to return to  or seek further treatment in ED, and the need for timely follow up as directed.    Condition: Stable  Disposition:  Discharge    Orders and Diagnoses  Diagnoses and all orders for this visit:  Acute cystitis without hematuria  -     POCT UA Automated manually resulted  -     nitrofurantoin, macrocrystal-monohydrate, (Macrobid) 100 mg capsule; Take 1 capsule (100 mg) by mouth 2 times a day for 5 days.  -     Urine Culture      Medical Admin Record      Patient disposition: Home    Electronically signed by SERGIO Vazquez  11:32 AM

## 2024-11-26 ENCOUNTER — DOCUMENTATION (OUTPATIENT)
Dept: URGENT CARE | Age: 74
End: 2024-11-26

## 2024-11-26 LAB — BACTERIA UR CULT: ABNORMAL

## 2024-11-26 NOTE — PROGRESS NOTES
Urine culture with E.coli.  Treated appropriately with Macrobid.  Left message with patient to notify.

## 2024-11-27 ENCOUNTER — TELEPHONE (OUTPATIENT)
Dept: URGENT CARE | Age: 74
End: 2024-11-27

## 2024-12-06 ENCOUNTER — LAB (OUTPATIENT)
Dept: LAB | Facility: LAB | Age: 74
End: 2024-12-06
Payer: MEDICARE

## 2024-12-06 DIAGNOSIS — Z01.818 PRE-OP TESTING: ICD-10-CM

## 2024-12-06 LAB
ALBUMIN SERPL BCP-MCNC: 4.7 G/DL (ref 3.4–5)
ALP SERPL-CCNC: 78 U/L (ref 33–136)
ALT SERPL W P-5'-P-CCNC: 24 U/L (ref 7–45)
ANION GAP SERPL CALC-SCNC: 11 MMOL/L (ref 10–20)
AST SERPL W P-5'-P-CCNC: 30 U/L (ref 9–39)
BASOPHILS # BLD AUTO: 0.04 X10*3/UL (ref 0–0.1)
BASOPHILS NFR BLD AUTO: 0.7 %
BILIRUB SERPL-MCNC: 0.7 MG/DL (ref 0–1.2)
BUN SERPL-MCNC: 15 MG/DL (ref 6–23)
CALCIUM SERPL-MCNC: 9.6 MG/DL (ref 8.6–10.3)
CHLORIDE SERPL-SCNC: 100 MMOL/L (ref 98–107)
CO2 SERPL-SCNC: 28 MMOL/L (ref 21–32)
CREAT SERPL-MCNC: 0.56 MG/DL (ref 0.5–1.05)
EGFRCR SERPLBLD CKD-EPI 2021: >90 ML/MIN/1.73M*2
EOSINOPHIL # BLD AUTO: 0.11 X10*3/UL (ref 0–0.4)
EOSINOPHIL NFR BLD AUTO: 1.9 %
ERYTHROCYTE [DISTWIDTH] IN BLOOD BY AUTOMATED COUNT: 12.6 % (ref 11.5–14.5)
EST. AVERAGE GLUCOSE BLD GHB EST-MCNC: 105 MG/DL
GLUCOSE SERPL-MCNC: 88 MG/DL (ref 74–99)
HBA1C MFR BLD: 5.3 %
HCT VFR BLD AUTO: 36.4 % (ref 36–46)
HGB BLD-MCNC: 12.6 G/DL (ref 12–16)
IMM GRANULOCYTES # BLD AUTO: 0.02 X10*3/UL (ref 0–0.5)
IMM GRANULOCYTES NFR BLD AUTO: 0.3 % (ref 0–0.9)
INR PPP: 1 (ref 0.9–1.1)
LYMPHOCYTES # BLD AUTO: 1.75 X10*3/UL (ref 0.8–3)
LYMPHOCYTES NFR BLD AUTO: 30.4 %
MCH RBC QN AUTO: 31.9 PG (ref 26–34)
MCHC RBC AUTO-ENTMCNC: 34.6 G/DL (ref 32–36)
MCV RBC AUTO: 92 FL (ref 80–100)
MONOCYTES # BLD AUTO: 0.5 X10*3/UL (ref 0.05–0.8)
MONOCYTES NFR BLD AUTO: 8.7 %
NEUTROPHILS # BLD AUTO: 3.34 X10*3/UL (ref 1.6–5.5)
NEUTROPHILS NFR BLD AUTO: 58 %
NRBC BLD-RTO: 0 /100 WBCS (ref 0–0)
PLATELET # BLD AUTO: 308 X10*3/UL (ref 150–450)
POTASSIUM SERPL-SCNC: 4.3 MMOL/L (ref 3.5–5.3)
PROT SERPL-MCNC: 7.2 G/DL (ref 6.4–8.2)
PROTHROMBIN TIME: 10.8 SECONDS (ref 9.8–12.8)
RBC # BLD AUTO: 3.95 X10*6/UL (ref 4–5.2)
SODIUM SERPL-SCNC: 135 MMOL/L (ref 136–145)
WBC # BLD AUTO: 5.8 X10*3/UL (ref 4.4–11.3)

## 2024-12-06 PROCEDURE — 80053 COMPREHEN METABOLIC PANEL: CPT

## 2024-12-06 PROCEDURE — 85610 PROTHROMBIN TIME: CPT

## 2024-12-06 PROCEDURE — 85025 COMPLETE CBC W/AUTO DIFF WBC: CPT

## 2024-12-06 PROCEDURE — 36415 COLL VENOUS BLD VENIPUNCTURE: CPT

## 2024-12-06 PROCEDURE — 83036 HEMOGLOBIN GLYCOSYLATED A1C: CPT

## 2024-12-07 PROBLEM — M17.12 PRIMARY OSTEOARTHRITIS OF LEFT KNEE: Status: ACTIVE | Noted: 2024-12-07

## 2024-12-07 NOTE — PROGRESS NOTES
Pre-Operative Assessment  This consult was requested by the physician below and my evaluation/recommendations will be communicated to the requesting MD by way of the shared electronic medical record, fax, or letter via the [employer].  -----------------------------------------------------------------------------------  Surgeon: Dr ABI Lopes   Type of Surgery/Procedure: L TKA  Patient scheduled for surgery on: 1/7/25  Diagnosis: primary OA L knee   Planned Anesthesia: spinal    HPI:  11/20/24 pain mgt epidural lumbar   [facility]        David Cheung is a 74 y.o. female who presents to the office today for a preoperative consultation at the request of surgeon  who plans on performing  on January 7. This consultation is requested for the specific conditions prompting preoperative evaluation (i.e. because of potential effect on operative risk): see medical history. Planned anesthesia: spinal. The patient has the following known anesthesia issues:  vomiting . Patients bleeding risk: no recent abnormal bleeding, no remote history of abnormal bleeding, and use of Ca-channel blockers (see med list). Patient does not have objections to receiving blood products if needed.    Review of Systems   All other systems reviewed and are negative.      Objective     Vitals:    12/09/24 1400   BP: 134/80   Pulse: 68   Temp: 35.4 °C (95.8 °F)   SpO2: 99%        Physical Exam   Gen: Alert, NAD  HEENT:  PERRLA, EOMI, conjunctiva and sclera normal in appearance. Oral cavity and posterior pharynx without lesions/exudate  Neck:  Supple with FROM; No RODNEY  Respiratory:  Lungs CTAB  Cardiovascular:  Heart RRR. No M/R/G.   Neuro:  CN II-XII intact; Gross motor and sensory intact  Skin:  No suspicious lesions present  Psychiatric Assessment: Brief mental examination revealed good judgment and reason, without hallucinations, abnormal affect or abnormal behaviors. Patient is not suicidal or  homicidal.    Predictors of intubation difficulty:   Morbid obesity? no  Body mass index is 24.3 kg/m².  Anatomically abnormal facies? no  Neck range of motion: normal    Cardiographics  ECG: scanned to chart   Echocardiogram:     Imaging  7/2/24 CT cardiac findings:  INCLUDED LUNGS, AIRWAYS AND PLEURA  Endotracheal / endobronchial lesion: Negative  Nodule: Negative  Airspace disease: Negative  Pleural effusion: Negative  Pneumothorax: Negative  Other: No acute or contributory unanticipated findings  6/6/24 XR L knee:  AP lateral left knee x-ray shows medial joint space narrowing near  bone-on-bone arthrosis slight varus tilt. On the lateral view there  is severe patellofemoral arthrosis with osteophyte spur formation.    Lab Review  Below is the patient's most recent value for Albumin, ALT, AST, BUN, Calcium, Chloride, Cholesterol, CO2, Creatinine, GFR, Glucose, HDL, Hematocrit, Hemoglobin, Hemoglobin A1C, LDL, Magnesium, Phosphorus, Platelets, Potassium, PSA, Sodium, Triglycerides, and WBC.   Lab Results   Component Value Date    ALBUMIN 4.7 12/06/2024    ALT 24 12/06/2024    AST 30 12/06/2024    BUN 15 12/06/2024    CALCIUM 9.6 12/06/2024     12/06/2024    CHOL 174 08/13/2024    CO2 28 12/06/2024    CREATININE 0.56 12/06/2024    HDL 83.1 08/13/2024    HCT 36.4 12/06/2024    HGB 12.6 12/06/2024    HGBA1C 5.3 12/06/2024    MG 1.90 01/13/2021     12/06/2024    K 4.3 12/06/2024     (L) 12/06/2024    TRIG 65 08/13/2024    WBC 5.8 12/06/2024     Note: for a comprehensive list of the patient's lab results, access the Results Review activity.    Assessment/Plan   74 y.o. female with planned surgery as above.    Known risk factors for perioperative complications: None    Difficulty with intubation is not anticipated.    Current medications which may produce withdrawal symptoms if withheld perioperatively:     1. Preoperative workup as follows ECG, hemoglobin, hematocrit, electrolytes, creatinine,  glucose, liver function studies, coagulation studies.  2. Change in medication regimen before surgery: Stop NSAIDs/ASA/Jacob/Fish Oil 7 days prior to surgery.  3. Prophylaxis for cardiac events with perioperative beta-blockers: should be considered, specific regimen per anesthesia.  4. Invasive hemodynamic monitoring perioperatively: at the discretion of anesthesiologist.  5. Deep vein thrombosis prophylaxis postoperatively:regimen to be chosen by surgical team.  6. Surveillance for postoperative MI with ECG immediately postoperatively and on postoperative days 1 and 2 AND troponin levels 24 hours postoperatively and on day 4 or hospital discharge (whichever comes first): at the discretion of anesthesiologist per surgical protocol  7. Other measures: Preoperative alcohol abstention x 30 days. and per surgical protocol     Patient Active Problem List   Diagnosis    Routine adult health maintenance    Advanced directives, counseling/discussion    Cardiac risk counseling    Screening for multiple conditions    Abnormal screening cardiac CT    Allergic rhinitis    Arthritis    TMJ (temporomandibular joint disorder)    Disorder of bone density and structure, unspecified    Diverticulosis of colon    GERD without esophagitis    Hearing loss, bilateral    Hyperlipidemia    Hypertension, essential    Hyponatremia    Venous stasis of lower extremity    Vitamin D deficiency    H/O esophagitis    H/O esophageal ulcer    Cataract    Raynaud's phenomenon without gangrene    Alcohol screening    H/O nonmelanoma skin cancer    BMI 25.0-25.9,adult    Intrinsic eczema    Lumbar disc disease    Lumbar spondylosis    H/O retinal vein occlusion (CMS-HCC)    Encounter for screening mammogram for breast cancer    Screening for colorectal cancer    Primary osteoarthritis of left knee       Past Medical History:   Diagnosis Date    Abnormal screening cardiac CT     2018: CT calcium score =19    Abnormal screening cardiac CT 07/03/2024    1.  Coronary artery calcium score of 97(LAD). 2. JEFFREY 62nd percentile** for age, gender, and race in asymptomatic patients.    H/O bone density study     12/21: T score- 2.0 FRAX* 10-year Probability of Fracture Based on femoral neck BMD: DualFemur (Left) Major Osteoporotic Fracture: 19.5% Hip Fracture:                4.3% 10/16: -1.8 12/19: Ten Year Major Osteoporotic Fracture Risk: 10.42%  Ten Year Hip Fracture Risk: 1.76%  T-Score: -1.8    H/O bone density study 10/06/2023    Lowest T score -1.9. 10-year Fracture Risk: Major Osteoporotic Fracture  12.2% Hip Fracture                        2.9%    H/O CT scan of abdomen     4/22: 1. Copious feces. 2. Diverticulosis. 3. Nonspecific fairly mild gastric through proximal jejunal distension. 4. Spinal degenerative changes include marked lower lumbar facet joint DJD accounting for grade I/IV spondylolisthesis of L4 on L5. Mild scoliosis. 5. A 1.6 cm wide umbilical hernia contains only fat. 6. Diastasis recti.    H/O magnetic resonance imaging of lumbar spine 05/31/2024    Multilevel discogenic degenerative changes of the lumbar spine, greater at L2-3.    Multilevel bulging disc with multilevel degenerative subluxations and dextroconvexity of the lumbar spine. These findings contribute to mild to moderate central canal narrowing at L3-4 and L4-5. There is multilevel neural foraminal narrowing bilaterally which is greatest at L2-3 on the left.    H/O x-ray of lumbar spine 12/19/2023    stable grade 1 anterolisthesis of L4 on L5 presence of spondylolisthesis. Approximate 20 degrees of degenerative scoliotic curve noted. Multiple levels of degenerative disc disease and ventral osteophytes seen throughout. Mild osteoarthritic changes seen through the limited portions of the femoroacetabular joints.     Past Surgical History:   Procedure Laterality Date    BLADDER SUSPENSION  11/18/2019    CARDIAC CATHETERIZATION  11/18/2019 2010 - Normal    COLONOSCOPY  01/30/2020    1/15:  diverticulosis (5yrs) 2009:mild diverticulosis sigmoid (7yrs) : diverticulosis    ESOPHAGOGASTRODUODENOSCOPY  2019    10/15: LA grade C reflux esophagitis : normal    FL GUIDED ASPIRATION OR INJECTION LARGE JOINT LEFT Left 2023    FL GUIDED ASPIRATION OR INJECTION LARGE JOINT LEFT 2023 Jose C Yoo MD [facility]    OTHER SURGICAL HISTORY  2018    Oral surgery    OTHER SURGICAL HISTORY  2018    Tonsillectomy    OTHER SURGICAL HISTORY  2018    Foot surgery    OTHER SURGICAL HISTORY  2018    Rotator cuff repair    OTHER SURGICAL HISTORY  2019    [place] tooth extraction    OTHER SURGICAL HISTORY  2019    Appendectomy    OTHER SURGICAL HISTORY  2019    Carpal tunnel surgery    OTHER SURGICAL HISTORY  2019    Colposcopy    TOTAL ABDOMINAL HYSTERECTOMY W/ BILATERAL SALPINGOOPHORECTOMY  2019    Hysterectomy    TOTAL KNEE ARTHROPLASTY  2019    Right - -     Social History     Social History Narrative    , 3 kids, 2 stepkids    Retired     Nonsmoker    ETOH:1 daily and occasionally 2 drinks    ---    Family History:    MCousin: Breast Cancer       F:  CAD (CABG age 61), HTN, HPL, Bile Duct Cancer ( 67)                    M:   Coronary Artery Disease/CABG age 69, HTN, HPL ( age 89)            B: HPL, HTN    B: HPL, CAD/PTCA, Cataract    S: HTN, Cataracts    S: Cataracts    Cousin:  leukemia              Problem List Items Addressed This Visit          Medium    Hyperlipidemia    Overview     2018: CT calcium score =19  Goal LDL <100   on statin twice weekly   Used to take Tricor  AE with Zocor (tolerated Crestor once weekly);          Relevant Medications    rosuvastatin (Crestor) 10 mg tablet    Hypertension, essential    Overview     2020:    home cuff: 139/81 p 61   office cuff: 138/82 p 60;   Goal BP <130/80   Lisinopril caused cough, but tolerable was d'c due to low BP with HCTZ;   HCTZ caused  hyponatremia  On CCB and ARB (AE on 50mg Losartan, LH/imbalance)   Hyponatremia likely due to one of these meds         Relevant Medications    losartan (Cozaar) 25 mg tablet    Lumbar disc disease    Overview     MRI 1/24: Multilevel discogenic degenerative changes of the lumbar spine, greater at L2-3.  Multilevel bulging disc with multilevel degenerative subluxations and  dextroconvexity of the lumbar spine. These findings contribute to  mild to moderate central canal narrowing at L3-4 and L4-5. There is  multilevel neural foraminal narrowing bilaterally which is greatest  at L2-3 on the left.  Stopped Mobic in preparation TKA 1/7/25 11/20/24: lumbar injection per pain mgt          Primary osteoarthritis of left knee    Overview     6/6/24 XR L knee:  AP lateral left knee x-ray shows medial joint space narrowing near  bone-on-bone arthrosis slight varus tilt. On the lateral view there  is severe patellofemoral arthrosis with osteophyte spur formation.    1/7/25: TKA planned with Dr ABI Lopes          Relevant Orders    ECG 12 lead (Clinic Performed) (Completed)    Disability Placard     Other Visit Diagnoses       Encounter for pre-operative examination    -  Primary    Surgeon: Dr ABI Lopes   Surgery/Procedure: L TKA  Scheduled: 1/7/25  Diagnosis: primary OA L knee   Planned Anesthesia: spinal  CLEARED FOR SURGERY AS SCHEDULED    Relevant Orders    ECG 12 lead (Clinic Performed) (Completed)

## 2024-12-09 ENCOUNTER — APPOINTMENT (OUTPATIENT)
Dept: PRIMARY CARE | Facility: CLINIC | Age: 74
End: 2024-12-09
Payer: MEDICARE

## 2024-12-09 VITALS
OXYGEN SATURATION: 99 % | DIASTOLIC BLOOD PRESSURE: 80 MMHG | WEIGHT: 137.2 LBS | HEIGHT: 63 IN | SYSTOLIC BLOOD PRESSURE: 134 MMHG | BODY MASS INDEX: 24.31 KG/M2 | TEMPERATURE: 95.8 F | HEART RATE: 68 BPM

## 2024-12-09 DIAGNOSIS — E78.5 HYPERLIPIDEMIA, UNSPECIFIED HYPERLIPIDEMIA TYPE: ICD-10-CM

## 2024-12-09 DIAGNOSIS — Z01.818 ENCOUNTER FOR PRE-OPERATIVE EXAMINATION: Primary | ICD-10-CM

## 2024-12-09 DIAGNOSIS — M17.12 PRIMARY OSTEOARTHRITIS OF LEFT KNEE: ICD-10-CM

## 2024-12-09 DIAGNOSIS — I10 HYPERTENSION, ESSENTIAL: ICD-10-CM

## 2024-12-09 DIAGNOSIS — M51.9 LUMBAR DISC DISEASE: ICD-10-CM

## 2024-12-09 PROCEDURE — 3079F DIAST BP 80-89 MM HG: CPT | Performed by: NURSE PRACTITIONER

## 2024-12-09 PROCEDURE — G2211 COMPLEX E/M VISIT ADD ON: HCPCS | Performed by: NURSE PRACTITIONER

## 2024-12-09 PROCEDURE — 1036F TOBACCO NON-USER: CPT | Performed by: NURSE PRACTITIONER

## 2024-12-09 PROCEDURE — 1123F ACP DISCUSS/DSCN MKR DOCD: CPT | Performed by: NURSE PRACTITIONER

## 2024-12-09 PROCEDURE — 1160F RVW MEDS BY RX/DR IN RCRD: CPT | Performed by: NURSE PRACTITIONER

## 2024-12-09 PROCEDURE — 93000 ELECTROCARDIOGRAM COMPLETE: CPT | Performed by: NURSE PRACTITIONER

## 2024-12-09 PROCEDURE — 1159F MED LIST DOCD IN RCRD: CPT | Performed by: NURSE PRACTITIONER

## 2024-12-09 PROCEDURE — 99214 OFFICE O/P EST MOD 30 MIN: CPT | Performed by: NURSE PRACTITIONER

## 2024-12-09 PROCEDURE — 3075F SYST BP GE 130 - 139MM HG: CPT | Performed by: NURSE PRACTITIONER

## 2024-12-09 RX ORDER — LOSARTAN POTASSIUM 25 MG/1
25 TABLET ORAL DAILY
Qty: 90 TABLET | Refills: 3 | Status: SHIPPED | OUTPATIENT
Start: 2024-12-09 | End: 2025-12-09

## 2024-12-09 RX ORDER — ROSUVASTATIN CALCIUM 10 MG/1
TABLET, COATED ORAL
Qty: 24 TABLET | Refills: 1 | Status: SHIPPED | OUTPATIENT
Start: 2024-12-09

## 2025-01-02 ENCOUNTER — OFFICE VISIT (OUTPATIENT)
Dept: ORTHOPEDIC SURGERY | Facility: CLINIC | Age: 75
End: 2025-01-02
Payer: MEDICARE

## 2025-01-02 VITALS — HEIGHT: 63 IN | BODY MASS INDEX: 24.45 KG/M2 | WEIGHT: 138 LBS

## 2025-01-02 DIAGNOSIS — Z96.652 STATUS POST TOTAL KNEE REPLACEMENT, LEFT: Primary | ICD-10-CM

## 2025-01-02 DIAGNOSIS — G89.18 ACUTE POSTOPERATIVE PAIN: ICD-10-CM

## 2025-01-02 PROCEDURE — 99211 OFF/OP EST MAY X REQ PHY/QHP: CPT | Performed by: PHYSICIAN ASSISTANT

## 2025-01-02 RX ORDER — DOCUSATE SODIUM 100 MG/1
100 CAPSULE, LIQUID FILLED ORAL 2 TIMES DAILY PRN
Qty: 60 CAPSULE | Refills: 0 | Status: SHIPPED | OUTPATIENT
Start: 2025-01-02

## 2025-01-02 RX ORDER — OXYCODONE HYDROCHLORIDE 5 MG/1
5 TABLET ORAL EVERY 6 HOURS PRN
Qty: 28 TABLET | Refills: 0 | Status: SHIPPED | OUTPATIENT
Start: 2025-01-02 | End: 2025-01-09

## 2025-01-02 RX ORDER — ACETAMINOPHEN 325 MG/1
650 TABLET ORAL EVERY 6 HOURS PRN
Qty: 42 TABLET | Refills: 0 | Status: SHIPPED | OUTPATIENT
Start: 2025-01-02

## 2025-01-02 RX ORDER — ONDANSETRON 4 MG/1
4 TABLET, FILM COATED ORAL EVERY 8 HOURS PRN
Qty: 20 TABLET | Refills: 0 | Status: SHIPPED | OUTPATIENT
Start: 2025-01-02 | End: 2025-01-09

## 2025-01-02 RX ORDER — CHLORHEXIDINE GLUCONATE ORAL RINSE 1.2 MG/ML
SOLUTION DENTAL
Qty: 30 ML | Refills: 0 | Status: SHIPPED | OUTPATIENT
Start: 2025-01-02

## 2025-01-02 RX ORDER — ASPIRIN 81 MG/1
81 TABLET ORAL 2 TIMES DAILY
Qty: 60 TABLET | Refills: 0 | Status: SHIPPED | OUTPATIENT
Start: 2025-01-02 | End: 2025-02-01

## 2025-01-02 RX ORDER — CYCLOBENZAPRINE HCL 10 MG
10 TABLET ORAL 3 TIMES DAILY PRN
Qty: 21 TABLET | Refills: 0 | Status: SHIPPED | OUTPATIENT
Start: 2025-01-02 | End: 2025-01-09

## 2025-01-02 NOTE — PROGRESS NOTES
History and Physical      CHIEF COMPLAINT: Left knee pain    HISTORY OF PRESENT ILLNESS:      The patient is a74 y.o. female with significant past medical history of left knee pain due to osteoarthritis.  She has exhausted conservative measures.  After risk benefits alternatives were discussed patient likes to proceed with a left total knee replacement.      Past Medical History:  Past Medical History:   Diagnosis Date    Abnormal screening cardiac CT     2018: CT calcium score =19    Abnormal screening cardiac CT 07/03/2024    1. Coronary artery calcium score of 97(LAD). 2. JEFFREY 62nd percentile** for age, gender, and race in asymptomatic patients.    H/O bone density study     12/21: T score- 2.0 FRAX* 10-year Probability of Fracture Based on femoral neck BMD: DualFemur (Left) Major Osteoporotic Fracture: 19.5% Hip Fracture:                4.3% 10/16: -1.8 12/19: Ten Year Major Osteoporotic Fracture Risk: 10.42%  Ten Year Hip Fracture Risk: 1.76%  T-Score: -1.8    H/O bone density study 10/06/2023    Lowest T score -1.9. 10-year Fracture Risk: Major Osteoporotic Fracture  12.2% Hip Fracture                        2.9%    H/O CT scan of abdomen     4/22: 1. Copious feces. 2. Diverticulosis. 3. Nonspecific fairly mild gastric through proximal jejunal distension. 4. Spinal degenerative changes include marked lower lumbar facet joint DJD accounting for grade I/IV spondylolisthesis of L4 on L5. Mild scoliosis. 5. A 1.6 cm wide umbilical hernia contains only fat. 6. Diastasis recti.    H/O magnetic resonance imaging of lumbar spine 05/31/2024    Multilevel discogenic degenerative changes of the lumbar spine, greater at L2-3.    Multilevel bulging disc with multilevel degenerative subluxations and dextroconvexity of the lumbar spine. These findings contribute to mild to moderate central canal narrowing at L3-4 and L4-5. There is multilevel neural foraminal narrowing bilaterally which is greatest at L2-3 on the left.     H/O x-ray of lumbar spine 12/19/2023    stable grade 1 anterolisthesis of L4 on L5 presence of spondylolisthesis. Approximate 20 degrees of degenerative scoliotic curve noted. Multiple levels of degenerative disc disease and ventral osteophytes seen throughout. Mild osteoarthritic changes seen through the limited portions of the femoroacetabular joints.        Past Surgical History:    Past Surgical History:   Procedure Laterality Date    BLADDER SUSPENSION  11/18/2019    CARDIAC CATHETERIZATION  11/18/2019    2010 - Normal    COLONOSCOPY  01/30/2020    1/15: diverticulosis (5yrs) 2009:mild diverticulosis sigmoid (7yrs) 1/20: diverticulosis    ESOPHAGOGASTRODUODENOSCOPY  11/19/2019    10/15: LA grade C reflux esophagitis 2/16: normal    FL GUIDED ASPIRATION OR INJECTION LARGE JOINT LEFT Left 12/11/2023    FL GUIDED ASPIRATION OR INJECTION LARGE JOINT LEFT [facility]    OTHER SURGICAL HISTORY  11/06/2018    Oral surgery    OTHER SURGICAL HISTORY  11/06/2018    Tonsillectomy    OTHER SURGICAL HISTORY  11/06/2018    Foot surgery    OTHER SURGICAL HISTORY  11/06/2018    Rotator cuff repair    OTHER SURGICAL HISTORY  09/23/2019    [place] tooth extraction    OTHER SURGICAL HISTORY  09/23/2019    Appendectomy    OTHER SURGICAL HISTORY  09/23/2019    Carpal tunnel surgery    OTHER SURGICAL HISTORY  09/23/2019    Colposcopy    TOTAL ABDOMINAL HYSTERECTOMY W/ BILATERAL SALPINGOOPHORECTOMY  11/18/2019    Hysterectomy    TOTAL KNEE ARTHROPLASTY  08/19/2019    Right - 1-2018       Medications Prior to Admission:    Current Outpatient Medications on File Prior to Visit   Medication Sig Dispense Refill    amLODIPine (Norvasc) 2.5 mg tablet Take 1 tablet (2.5 mg) by mouth once daily. 90 tablet 3    Bacillus subtilis-inulin 1.5 billion cell-1 gram tablet,chewable Bacillus subtilis-inulin 1.5 billion cell-1 gram tablet,chewable Chew. 0 02/17/2021 Active      calcium carbonate-vitamin D3 500  mg-3.125 mcg (125 unit) tablet tablet Take 1 tablet by mouth once daily.      cetirizine (ZyrTEC) 10 mg tablet Take 1 tablet (10 mg) by mouth once daily.      cyanocobalamin (Vitamin B-12) 100 mcg tablet Take 1 tablet (100 mcg) by mouth once daily.      latanoprost (Xalatan) 0.005 % ophthalmic solution Administer 1 drop into both eyes once daily at bedtime.      losartan (Cozaar) 25 mg tablet Take 1 tablet (25 mg) by mouth once daily. 90 tablet 3    multivitamin tablet Take 1 tablet by mouth once daily.      pantoprazole (Protonix) 20 mg EC tablet Take 1 tablet (20 mg) by mouth once daily in the morning. Take before meals. Do not crush, chew, or split. 90 tablet 3    rosuvastatin (Crestor) 10 mg tablet TAKE 1 TABLET Three Times WEEKLY 24 tablet 1     No current facility-administered medications on file prior to visit.        Allergies:  Atorvastatin, Hydrochlorothiazide, Hydrocodone-acetaminophen, Indomethacin, and Lisinopril    Social History:   Social History     Socioeconomic History    Marital status:      Spouse name: Not on file    Number of children: Not on file    Years of education: Not on file    Highest education level: Not on file   Occupational History    Not on file   Tobacco Use    Smoking status: Never    Smokeless tobacco: Never   Substance and Sexual Activity    Alcohol use: Yes     Comment: ETOH:1 daily and occasionally 2 drinks    Drug use: Never    Sexual activity: Not on file   Other Topics Concern    Not on file   Social History Narrative    , 3 kids, 2 stepkids    Retired     Nonsmoker    ETOH:1 daily and occasionally 2 drinks    ---    Family History:    MCousin: Breast Cancer       F:  CAD (CABG age 61), HTN, HPL, Bile Duct Cancer ( 67)                    M:   Coronary Artery Disease/CABG age 69, HTN, HPL ( age 89)            B: HPL, HTN    B: HPL, CAD/PTCA, Cataract    S: HTN, Cataracts    S: Cataracts    Cousin:  leukemia              Social Drivers of  "Health     Financial Resource Strain: Not on file   Food Insecurity: Not on file   Transportation Needs: Not on file   Physical Activity: Not on file   Stress: Not on file   Social Connections: Not on file   Intimate Partner Violence: Not on file   Housing Stability: Not on file       Family History:   Family History   Problem Relation Name Age of Onset    Breast cancer Maternal Cousin          Review of systems: Noncontributory for orthopedics    Vitals:  Ht 1.6 m (5' 3\")   Wt 62.6 kg (138 lb)   BMI 24.45 kg/m²     Physical examination:  Head normocephalic atraumatic  Heart regular rate and rhythm  Lungs clear  Abdominal exam nontender nondistended  Extremity: Left knee medial joint line tenderness mild positive effusion current range of motion is 0 to 130 degrees    Impression : Left knee degenerative joint disease      Plan:  Scheduled for left total knee replacement    Risk and benefits of surgery discussed extensively with the patient.    Surgical risk included but were not limited to infection, wear, loosening, need for further surgery blood clot, failure to heal, failure of the surgery, stiffness, loss of limb life, extremity function change in length change, and associated risks of surgery during the coronavirus epidemic.    Risk with any surgery including arthroplasty and replacements include but are not limited to:       Wear, loosening, infection, blood clot, DVT, loss of limb, life, delayed recovery, limb length change, instability, dislocation, discomfort with new implant and failure of the procedure.  "

## 2025-01-07 ENCOUNTER — OUTSIDE PROCEDURE (OUTPATIENT)
Dept: ORTHOPEDIC SURGERY | Facility: CLINIC | Age: 75
End: 2025-01-07
Payer: MEDICARE

## 2025-01-07 PROCEDURE — 27447 TOTAL KNEE ARTHROPLASTY: CPT | Performed by: PHYSICIAN ASSISTANT

## 2025-01-07 PROCEDURE — 27447 TOTAL KNEE ARTHROPLASTY: CPT | Performed by: ORTHOPAEDIC SURGERY

## 2025-01-07 NOTE — PROGRESS NOTES
Preop diagnosis: Left knee osteoarthritis    Postop diagnosis: Left knee osteoarthritis    Procedure: Left total knee replacement    Surgeon: Gio Lopes M.D.     Assistant: Gio Gant physician assistant (PAC) was required and present throughout the entire case.  Given the nature of the disease process and the procedure, a skilled surgical first assistant was necessary during the entire case.  The assistant was necessary to hold retractors and manipulate extremity during the procedure.  A certified scrub tech was also present at the back table managing instruments with supplies for the surgical case      Anesthesia:  Spinal with a regional block    Blood loss: Less than 5 cc    Fluids: Less than 2 L       Indications: Left knee severe end-stage osteoarthrosis failing years of conservative treatment now for total knee replacement      Summation of event:  Operative Report:       Procedure left total knee replacement    Components size:    Femoral size cemented Pillo persona 6     Tibial size cemented Pillo persona EE    Patella size cemented 32 mm    Terra size 12 mm PS    Alignment varus    Summation of surgical procedure    The patient was taken to the operating room and surgical timeout performed.  The patient received preoperative antibiotics.  After satisfactory anesthetic the appropriate knee was prepped and draped in the usual sterile fashion.  A medial prepatellar incision was made and dissection carried sharply through the skin and underlying fat to the fascial tissue.  A medial capsular incision was made and the joint entered.  There was a modest amount of synovial fluid.  Synovium was hypertrophic.    A drill hole was made in the center of the femur and the intramedullary guide inserted.  The cutting jig was inserted with 3° of external rotation and the distal femoral cut was made.  The jig the rods were removed and the appropriate size template inserted.  The anterior and  posterior condyles were cut with an oscillating saw, and the chamfering cuts were made.  Final preparation of the femur was completed and this allowed for translating the tibia anteriorly.    A drill hole was made in the proximal tibia just in line with the old ACL footprint.  An intramedullary guide was used in the tibia and a cut was made 2 mm below the worn tibial surface after guidepins were placed and the tibial cutting guide was fitted on the anterior tibia with approximately 3° of slope posteriorly.    After the tibia was cut appropriate sizing of the flexion-extension gaps was measured Intra-Op, adjustments were made as needed to correct the gaps so that they were symmetric in both flexion and extension.  We then trialed the appropriate size tibia on the proximal tibia and finish tibial preparation first with a punch after insertion of the central reamer.    Trial femur and tibial components were inserted and we selected and trialed the appropriate poly iner.  This was used to control stability which was assessed in both flexion extension and the varus valgus plane.    With the knee in full extension the patella preparation was completed we reamed the patella with the appropriate size reamer to leave a residual 14-15 mm of natural patella.  Final patella preparation included 3 drill holes and patellar tracking was assessed.    Trials were inserted, appropriate sizes were determined and ligament balance was performed.  A batch of cement was mixed after lavage and careful bone drying, procedures were inserted and held in full extension until the cement had hardened with a patella clamp in place.  The ligaments were checked and stable.  The wound was then closed in layers using #1 Vicryl to close the arthrotomy and a figure-of-eight manner, inverted interrupted 2-0 Vicryl the subcutaneous tissues, a running 3-0 Monocryl in the subcutaneous and skin layers compressive dressing was then applied and a bulky  sterile fashion.  The patient tolerated the procedure well and returned to the post anesthesia recovery room in satisfactory condition.  Postop  operative x-ray was reviewed and the findings

## 2025-01-13 ENCOUNTER — TELEPHONE (OUTPATIENT)
Dept: PHYSICAL THERAPY | Facility: CLINIC | Age: 75
End: 2025-01-13
Payer: MEDICARE

## 2025-01-13 ENCOUNTER — TELEPHONE (OUTPATIENT)
Dept: ORTHOPEDIC SURGERY | Facility: CLINIC | Age: 75
End: 2025-01-13
Payer: MEDICARE

## 2025-01-13 DIAGNOSIS — G89.18 ACUTE POSTOPERATIVE PAIN: ICD-10-CM

## 2025-01-13 DIAGNOSIS — Z96.652 STATUS POST TOTAL KNEE REPLACEMENT, LEFT: ICD-10-CM

## 2025-01-13 RX ORDER — OXYCODONE HYDROCHLORIDE 5 MG/1
5 TABLET ORAL EVERY 6 HOURS PRN
Qty: 28 TABLET | Refills: 0 | Status: SHIPPED | OUTPATIENT
Start: 2025-01-13 | End: 2025-01-22 | Stop reason: SDUPTHER

## 2025-01-13 RX ORDER — ACETAMINOPHEN 325 MG/1
650 TABLET ORAL EVERY 6 HOURS PRN
Qty: 42 TABLET | Refills: 0 | Status: SHIPPED | OUTPATIENT
Start: 2025-01-13

## 2025-01-13 NOTE — TELEPHONE ENCOUNTER
Patient called lvm to get schedule for Physical therapy for Post op LT KNEE . I called patient back unable to get a hold of her. LVM to call us back and get scheduled.

## 2025-01-22 ENCOUNTER — TELEPHONE (OUTPATIENT)
Dept: ORTHOPEDIC SURGERY | Facility: CLINIC | Age: 75
End: 2025-01-22
Payer: MEDICARE

## 2025-01-22 DIAGNOSIS — G89.18 ACUTE POSTOPERATIVE PAIN: ICD-10-CM

## 2025-01-22 DIAGNOSIS — Z96.652 STATUS POST TOTAL KNEE REPLACEMENT, LEFT: ICD-10-CM

## 2025-01-22 RX ORDER — OXYCODONE HYDROCHLORIDE 5 MG/1
5 TABLET ORAL EVERY 6 HOURS PRN
Qty: 28 TABLET | Refills: 0 | Status: SHIPPED | OUTPATIENT
Start: 2025-01-22 | End: 2025-01-29

## 2025-01-23 ENCOUNTER — HOSPITAL ENCOUNTER (OUTPATIENT)
Dept: RADIOLOGY | Facility: CLINIC | Age: 75
Discharge: HOME | End: 2025-01-23
Payer: MEDICARE

## 2025-01-23 ENCOUNTER — OFFICE VISIT (OUTPATIENT)
Dept: ORTHOPEDIC SURGERY | Facility: CLINIC | Age: 75
End: 2025-01-23
Payer: MEDICARE

## 2025-01-23 DIAGNOSIS — M17.12 PRIMARY OSTEOARTHRITIS OF LEFT KNEE: ICD-10-CM

## 2025-01-23 PROCEDURE — 73560 X-RAY EXAM OF KNEE 1 OR 2: CPT | Mod: LT

## 2025-01-23 PROCEDURE — 99211 OFF/OP EST MAY X REQ PHY/QHP: CPT | Performed by: PHYSICIAN ASSISTANT

## 2025-01-23 PROCEDURE — 73560 X-RAY EXAM OF KNEE 1 OR 2: CPT | Mod: LEFT SIDE | Performed by: ORTHOPAEDIC SURGERY

## 2025-01-23 NOTE — PROGRESS NOTES
History of present illness patient is status post left total knee replacement.  Overall she is doing very well.  She is ambulating with a walker.      Physical exam:      General: No acute distress or breathing difficulty or discomfort, pleasant and cooperative with the examination.    Extremities: Left knee incisions clean dry and intact.  She does have effusion but no redness drainage or evidence of infection.  Current range of motion is 4 degrees to 85 degrees.      Diagnostic studies: X-rays 2 views left knee show well aligned well-positioned left total knee replacement with no bony abnormality x-rays were read by Dr. Gio Lopes    Impression: Status post left total knee replacement    Plan: Patient will finish home therapy and switch over to outpatient physical therapy.  That has been arranged.  She is doing okay in terms of pain control.  She will follow-up in 5 to 6 weeks with x-rays 2 views of the left knee and a range of motion check.

## 2025-01-24 ENCOUNTER — EVALUATION (OUTPATIENT)
Dept: PHYSICAL THERAPY | Facility: CLINIC | Age: 75
End: 2025-01-24
Payer: MEDICARE

## 2025-01-24 DIAGNOSIS — R29.898 DECREASED STRENGTH OF LOWER EXTREMITY: ICD-10-CM

## 2025-01-24 DIAGNOSIS — G89.29 CHRONIC PAIN OF LEFT KNEE: Primary | ICD-10-CM

## 2025-01-24 DIAGNOSIS — M25.662 DECREASED ROM OF LEFT KNEE: ICD-10-CM

## 2025-01-24 DIAGNOSIS — M25.562 CHRONIC PAIN OF LEFT KNEE: Primary | ICD-10-CM

## 2025-01-24 PROCEDURE — 97110 THERAPEUTIC EXERCISES: CPT | Mod: GP

## 2025-01-24 PROCEDURE — 97161 PT EVAL LOW COMPLEX 20 MIN: CPT | Mod: GP

## 2025-01-24 ASSESSMENT — ENCOUNTER SYMPTOMS
DEPRESSION: 0
OCCASIONAL FEELINGS OF UNSTEADINESS: 1
LOSS OF SENSATION IN FEET: 0

## 2025-01-24 NOTE — PROGRESS NOTES
Physical Therapy Evaluation    Patient Name: Carly Cheung  MRN: [id number]  PT Evaluation Time Entry  PT Evaluation (Low) Time Entry: 28  PT Therapeutic Procedures Time Entry  Therapeutic Exercise Time Entry: 15                 Current Problem  1. Chronic pain of left knee        2. Decreased strength of lower extremity        3. Decreased ROM of left knee          Insurance    Insurance reviewed   Visit number: 1  [employer] COPAY 30 DED 0 , COVERAGE 100 OOP 4150(0) AUTH IS REQ AFTER EVAL TO INCLUDE THIS VISIT # [id number]   Subjective   General:  Pt is a 74 year old female who presents to clinic with L knee pain secondary to LTKA on 1/7/25 by Dr ABI Lopes. Pt has a history of significant L knee pain prior to surgery for around 1 year. She states saw Dr. Lopes in September 2024 and attempted conservative methods before electing for surgery. Pt denies SOB and posterior calf tenderness. Pt states things have been going well since surgery. She reports some stiffness this morning however. Pt had states she had 5 visits of home health and pt reports everything went well with that. Last visit with home health was 1/21/25. Pt was discharged from [facility] Gio Harasty PA-C, still has some swelling in knee. Pain today 6/10 and is described sharp, stabbing, and a pinching feeling. Lowest pain has been in the last week 4/10.     PMHx: RTKA, bilateral AFOs since 2021  Occupation: retired   PLOF: indepedt   Goal for Therapy: ambualte with no DME, improved strength, mobility, functioning   Home Environment: house, 2 stories, [address],      Precautions:  none  Pain:  6/10  REDUCES SYMPTOMS: exercise, walking, medications, ice     PROVOKES SYMPTOMS: prolonged standing, sitting, AROM     MEDICATIONS USE TO ADDRESS SYMPTOMS: oxycodone, tylneol, muscle relaxers for the first time last evening     Red Flags: Do you have any of the following? none  Fever/chills, unexplained weight changes,  dizziness/fainting, unexplained change in bowel or bladder functions, unexplained malaise or muscle weakness, numbness or tingling  Does report night sweats    Reviewed medical screening form with pt and medical screening assessed    Imaging:   FINDINGS:  AP lateral left knee x-ray shows a well-positioned left total knee  replacement. No fracture dislocation. No lucency. Unremarkable two  views left total knee replacement    Objective   PALPATION: no TTP in posterior calf  GAIT: SPC for ambulation, antalgic gait, decreased stance time on LLE  OBSERVATION: no signs of infection, wound is healing well, mild edema and bruising around inscions                      AROM  Right knee flexion: 115   Left knee flexion: 82, 87 after heel slides AAROM  Right knee extension: -4    Left knee extension: -10, -8 after quad set             MMT  Right quadriceps: 5    Left quadriceps: 4-  Right iliopsoas: 4+    Left iliopsoas: 4  Right gluteus medius: 4-    Left gluteus medius: 4-  Right hip adductors: 5    Left hip adductors: 4  Right hamstrin   Left hamstrin-            Flexibility  Right hamstring: limited    Left hamstring: limited  Right quadriceps: limited   Left quadriceps: limited   Right iliopsoas: limited   Left iliopsoas: limited  Outcome Measures:        Treatment: See HEP below  Quad sets x10  Heel slides x10  SLR x10   SAQ x10  LAQ x 10  EDUCATION/HEP:  Access Code: [email or web address]  URL: [email or web address]  Date: 2025  Prepared by: Luz Palacio    Exercises  - Supine Heel Slide with Strap  - 1 x daily - 4 x weekly - 2 sets - 10 reps  - Supine Quad Set  - 1 x daily - 4 x weekly - 2 sets - 10 reps  - Supine Short Arc Quad  - 1 x daily - 4 x weekly - 2 sets - 10 reps  - Active Straight Leg Raise with Quad Set  - 1 x daily - 4 x weekly - 2 sets - 10 reps  - Seated Long Arc Quad  - 1 x daily - 4 x weekly - 2 sets - 10 reps  - Supine Hamstring Stretch with Strap  - 1 x daily - 4 x  weekly - 2 sets - 3 reps - 20s hold  - Seated Ankle Pumps  - 1 x daily - 4 x weekly - 3 sets - 10 reps  Assessment:  Pt is a 74 year old female who presents to clinic with L knee pain secondary to LTKA on 1/7/25 by Dr ABI Lopes. Impairments include pain, decreased ROM, decreased strength, edema, and gait. Functional limitations include stair navigation, squatting, and lifting. Pt tolerated treatment session well with no increase in pain noted. Pt demonstrated increased flexion ROM after heel slides and greater extension ROM after quad sets. Pt educated on diagnoses, prognosis, rationale for exercises, HEP, compliance and precautions. Pt demonstrated good understanding of all education. Pt denies SoB, calf pain, N/T, fever or chills. Pt is a good candidate for skilled PT to address outlined impairments and functional limitations to achieve patient-centered goals and return to PLOF.     Clinical Presentation: Stable     Level of Complexity: Low     Goals:  1. Pt is independent w/advanced HEP  2. L LE grossly 4+-5/5 including good eccentric l quad to perform all functional mobility  3. perform sit<>stand no UE assist from low surface for max functional mobility  4. L LE SLS >/=30 sec no UE assist on compliant surface without UE assist for functional carryover into dynamic balance  5. Pt will be able to ambulate community distance with no deficits over varying surfaces/inclines independent without AD or increased pain L LE  6. Pt will increase L knee flexion AROM to at least 115 deg to match contralateral knee and allow for optimal functional mobility.      Plan  Treatment/Interventions: Cryotherapy, Education/ Instruction, Electrical stimulation, Gait training, Manual therapy, Neuromuscular re-education, Self care/ home management, Therapeutic activities, Therapeutic exercises  PT Plan: Skilled PT  PT Frequency: 2 times per week  Duration: 5wks  Onset Date: 01/07/25  Rehab Potential: Good  Plan of Care Agreement:  Patient

## 2025-02-05 ENCOUNTER — TREATMENT (OUTPATIENT)
Dept: PHYSICAL THERAPY | Facility: CLINIC | Age: 75
End: 2025-02-05
Payer: MEDICARE

## 2025-02-05 DIAGNOSIS — M25.662 DECREASED ROM OF LEFT KNEE: ICD-10-CM

## 2025-02-05 DIAGNOSIS — M25.562 CHRONIC PAIN OF LEFT KNEE: Primary | ICD-10-CM

## 2025-02-05 DIAGNOSIS — G89.29 CHRONIC PAIN OF LEFT KNEE: Primary | ICD-10-CM

## 2025-02-05 DIAGNOSIS — R29.898 DECREASED STRENGTH OF LOWER EXTREMITY: ICD-10-CM

## 2025-02-05 PROCEDURE — 97110 THERAPEUTIC EXERCISES: CPT | Mod: GP,CQ

## 2025-02-05 ASSESSMENT — PAIN DESCRIPTION - DESCRIPTORS: DESCRIPTORS: DULL;ACHING

## 2025-02-05 ASSESSMENT — PAIN - FUNCTIONAL ASSESSMENT: PAIN_FUNCTIONAL_ASSESSMENT: 0-10

## 2025-02-05 ASSESSMENT — PAIN SCALES - GENERAL: PAINLEVEL_OUTOF10: 3

## 2025-02-05 NOTE — PROGRESS NOTES
"Physical Therapy Treatment    Patient Name: Carly Cheung  MRN: [id number]  Today's Date: 2/5/2025  Time Calculation  Start Time: 0832  Stop Time: 0916  Time Calculation (min): 44 min    Insurance   Insurance reviewed   Visit number: 2  [employer] COPAY 30 DED 0 , COVERAGE 100 OOP 4150(0) AUTH IS REQ AFTER EVAL TO INCLUDE THIS VISIT RF# [id number]      Current Problem  1. Chronic pain of left knee        2. Decreased strength of lower extremity        3. Decreased ROM of left knee            Subjective    General   Pt reports she is doing well today.  Reports no increase in sx's following initial visit & states, \"I felt good.  Tired of course\".  She reports she is improving & states \"Everything is getting easier.\"  Pt reports she has been doing some cooking & trying to go back to her bed at night to sleep.  Reports she can sleep for about 2-3 hours, then must return to the recliner.   Also states she is \"hardly using the cane around the house.\"     Precautions  Precautions  Precautions Comment: No recent falls reported    Pain  Pain Assessment  Pain Assessment: 0-10  0-10 (Numeric) Pain Score: 3  Pain Type: Surgical pain  Pain Location: Knee  Pain Orientation: Left  Pain Radiating Towards: none reported  Pain Descriptors: Dull, Aching  Pain Frequency: Constant/continuous  Clinical Progression: Gradually improving  Effect of Pain on Daily Activities: Walking, Standing>15 min, Negotiating Stairs, Sleep, Self-Care activities, Household activities    Objective   AAROM R Knee Flexion: 98 deg    Treatments:  Therapeutic Exercises (56945): 41 Minutes, 3 Units    Activities:  MITA: --> Add NV  Quad sets: 5\" 2x10 w/sm ball for feedback  Heel slides x10  Calf Stretch: 30\" x3  Hamstring Stretch: 30\" x3  SLR: 2x10   SL SLR: --> Add NV  Clamshell: --> Add NV  Bridge: x10  SAQ x10 (not this visit)  LAQ: 2x10  HR/TR: x20  Squats: 2x10  Stdg Hip PRE's: --> Add NV    Assessment:   Reviewed activities " initiated first visit, providing vc's as needed for correct technique.  She exhibits good recall of the activities, suggesting good compliance so far with HEP given.  Good follow through observed to cues provided.  Improved ROM this visit with heel slide, increasing from 87 deg at first visit to 98 deg this visit.   Small ball added to QS this visit for feedback to improve muscle contraction, increase strength, which she tolerated well. Calf & HS stretch added to decrease tightness.  Bridge, HR/TR & squats were added for increased strengthening.  She was appropriately challenged with activities given this visit.  Normal muscle soreness, fatigue reported.  Demonstrates good ability to continue progressing with POC as tolerated.     Plan:   Continue to progress with activities as tolerated to increase ROM, strength, stability & improve overall functional mobility, capacity for daily activities.

## 2025-02-07 ENCOUNTER — TREATMENT (OUTPATIENT)
Dept: PHYSICAL THERAPY | Facility: CLINIC | Age: 75
End: 2025-02-07
Payer: MEDICARE

## 2025-02-07 DIAGNOSIS — M25.662 DECREASED ROM OF LEFT KNEE: ICD-10-CM

## 2025-02-07 DIAGNOSIS — R29.898 DECREASED STRENGTH OF LOWER EXTREMITY: ICD-10-CM

## 2025-02-07 DIAGNOSIS — M25.562 CHRONIC PAIN OF LEFT KNEE: Primary | ICD-10-CM

## 2025-02-07 DIAGNOSIS — G89.29 CHRONIC PAIN OF LEFT KNEE: Primary | ICD-10-CM

## 2025-02-07 PROCEDURE — 97110 THERAPEUTIC EXERCISES: CPT | Mod: GP,CQ

## 2025-02-07 ASSESSMENT — PAIN DESCRIPTION - DESCRIPTORS: DESCRIPTORS: ACHING

## 2025-02-07 ASSESSMENT — PAIN SCALES - GENERAL: PAINLEVEL_OUTOF10: 2

## 2025-02-07 ASSESSMENT — PAIN - FUNCTIONAL ASSESSMENT: PAIN_FUNCTIONAL_ASSESSMENT: 0-10

## 2025-02-07 NOTE — PROGRESS NOTES
"Physical Therapy Treatment    Patient Name: Carly Cheung  MRN: [id number]  Today's Date: 2/7/2025  Time Calculation  Start Time: 0746  Stop Time: 0832  Time Calculation (min): 46 min    Insurance  Insurance reviewed   Visit number: 3  [employer] COPAY 30 DED 0 , COVERAGE 100 OOP 4150(0) AUTH IS REQ AFTER EVAL TO INCLUDE THIS VISIT RF# [id number]      Current Problem  1. Chronic pain of left knee        2. Decreased strength of lower extremity        3. Decreased ROM of left knee            Subjective    General   Pt reports she is doing well.  States she is moving around with less difficulty at home, but still feels \"tied to home\" & not able to get out yet due to weather, icy conditions.  Overall, is pleased with progress.  No new complaints.     Precautions  Precautions  Precautions Comment: No recent falls reported    Pain  Pain Assessment  Pain Assessment: 0-10  0-10 (Numeric) Pain Score: 2  Pain Type: Surgical pain  Pain Location: Knee  Pain Orientation: Left  Pain Radiating Towards: none reported  Pain Descriptors: Aching  Pain Frequency: Intermittent  Clinical Progression: Gradually improving  Effect of Pain on Daily Activities: Walking, Standing>15 min, Negotiating Stairs, Sleep, Self-Care activities, Household activities    Objective   AAROM R Knee Flexion: 101 deg     Treatments:  Therapeutic Exercises (70466): 43 Minutes, 3 Units    Activities:  MITA: Rocking->Full, 5 minutes  Quad sets: 5\" 2x10 w/sm ball for feedback  Heel slides 2x10  Calf Stretch: 30\" x3  Hamstring Stretch: 30\" x3  SLR: 2x10   SL SLR: x10  Clamshell: x10  Bridge: 2x10  SAQ x10 (not this visit)  LAQ: 2x10  HR/TR: x20  Squats: 2x10  Stdg Hip PRE's: --> Add NV    Assessment:   Initiated MITA at beginning of session to help reduce tightness, increase ROM/mobility & improve overall endurance.  She began with rocking, but was able to make full rotations (in reverse) after a couple minutes.  Some hip hiking noted " with full rotations, but vc's given to avoid with good follow through observed.  Also progressed with addition of SL SLR & clamshell this visit for further strengthening.  She was fairly challenged with SL ex's secondary to overall weakness & reports mild increase in discomfort, but able to complete.  Mild improvement noted with heel slide today, increasing from 98 deg to 101 deg.  Overall, good ability to complete the activities given.  She is making good progress with POC & exhibits good ability to continue progressing with activities as tolerated, per protocol.    Plan:   Continue to progress with activities as tolerated to increase ROM, strength, stability & improve overall functional mobility, capacity for daily activities.

## 2025-02-10 ENCOUNTER — TREATMENT (OUTPATIENT)
Dept: PHYSICAL THERAPY | Facility: CLINIC | Age: 75
End: 2025-02-10
Payer: MEDICARE

## 2025-02-10 DIAGNOSIS — M25.562 CHRONIC PAIN OF LEFT KNEE: Primary | ICD-10-CM

## 2025-02-10 DIAGNOSIS — G89.29 CHRONIC PAIN OF LEFT KNEE: Primary | ICD-10-CM

## 2025-02-10 DIAGNOSIS — R29.898 DECREASED STRENGTH OF LOWER EXTREMITY: ICD-10-CM

## 2025-02-10 DIAGNOSIS — M25.662 DECREASED ROM OF LEFT KNEE: ICD-10-CM

## 2025-02-10 PROCEDURE — 97140 MANUAL THERAPY 1/> REGIONS: CPT | Mod: GP,CQ

## 2025-02-10 PROCEDURE — 97110 THERAPEUTIC EXERCISES: CPT | Mod: GP,CQ

## 2025-02-10 NOTE — PROGRESS NOTES
"Physical Therapy Treatment    Patient Name: Carly Cheung  MRN: [id number]  Today's Date: 2/10/2025  Time Calculation  Start Time: 1006  Stop Time: 1046  Time Calculation (min): 40 min     PT Therapeutic Procedures Time Entry  Manual Therapy Time Entry: 10  Therapeutic Exercise Time Entry: 28                 Current Problem  1. Chronic pain of left knee        2. Decreased strength of lower extremity        3. Decreased ROM of left knee            Insurance:  Visit number: 4  [employer] COPAY 30 DED 0 , COVERAGE 100 OOP 4150(0) AUTH IS REQ AFTER EVAL TO INCLUDE THIS VISIT RF# [id number]      Precautions   No recent falls reported     Subjective   Subjective:   Pt reports that knee is doing pretty good.  Pain   1-2/10    Objective   AAROM R Knee Flexion: 104 deg    AROM -5* ext  Treatments:  MITA: 5 minutes  Quad sets: 5\" x15  Heel slides 10x 5\"  Calf Stretch: 30\" x3  Hamstring Stretch: 30\" x3  Seated knee ext stretch with self overpressure 10\"x5  SLR: x10   SL SLR: x10  Clamshell: x10---  Bridge: 15x  SAQ x15  LAQ: 2x10---  HR/TR: x20  Squats: 2x10  Stdg Hip PRE's flx, ext B     PROM L knee , tib mobs, femur mobs, STM to distal quad/ ITB 10'     OP EDUCATION:   Access Code: [email or web address]  URL: [email or web address]  Date: 02/10/2025  Prepared by: Aubree Rider    Exercises  - Seated Hamstring Stretch  - 1 x daily - 7 x weekly - 5 sets - 10 reps      Assessment:   Pt displays tightness in the knee with both flex and ext, however, is gradually improving. Included seated hamstring stretching to help with extension. Had weakness palpated with quad contraction during quad sets. didd well with MITA. Cues with SLR to focus on maintaining knee ext, vs height of raise. Able to use good form with squats. Good overall completion of ex given    Plan:   Cont with quad strengthening, to help promote more knee ext and improve knee flex              "

## 2025-02-12 NOTE — PROGRESS NOTES
"Physical Therapy Treatment    Patient Name: Carly Cheung  MRN: [id number]  Today's Date: 2/13/2025  Time Calculation  Start Time: 0916  Stop Time: 1003  Time Calculation (min): 47 min     PT Therapeutic Procedures Time Entry  Manual Therapy Time Entry: 10  Therapeutic Exercise Time Entry: 34                 Current Problem  1. Chronic pain of left knee        2. Decreased strength of lower extremity        3. Decreased ROM of left knee            Insurance:  Visit number: 5  [employer] COPAY 30 DED 0 , COVERAGE 100 OOP 4150(0) AUTH IS REQ AFTER EVAL TO INCLUDE THIS VISIT RF# [id number]      Precautions    No recent falls reported        Subjective   Subjective:   Pt reports that her knee is doing pretty good. She doesn't really have any ain when she is just at rest.   Pain   1/10    Objective   AAROM R Knee Flexion: 107 deg    AROM -4* ext  Treatments:  MITA: 5 minutes*  Quad sets: 5\" x15  Heel slides 10x 5\"  Calf Stretch: 30\" x3  Hamstring Stretch: 30\" x3  HR/TR: x20  LAQ: 2x10  Sit to stands w/o UE 12x  TKE RTB 15x3\"  Step ups F/L 4 in 15x  Stdg Hip PRE's ext, abd YTB B 10x ea    Not today:  Seated knee ext stretch with self overpressure 10\"x5--  SLR: x10 --  SL SLR: x10---  Clamshell: x10---  Bridge: 15x---  SAQ x15--  Squats: 2x10---     PROM L knee , tib mobs, femur mobs, STM to distal quad/ ITB 10'  OP EDUCATION:   Quad sets, sit to stands, heelslides      Assessment:   Pt tolerated heelslides fairly well. Has more difficulty when trying to maintain the knee in extended position, as she still lacks knee extension. Did well with sit to stands. Added TKE for more quad strengthening to promote knee ext. Able to do step ups without increased pain. Added TB to standing 2-way hip while maintaining good form.     Plan:   Cont to increase knee ROM and L LE strength              "

## 2025-02-13 ENCOUNTER — TREATMENT (OUTPATIENT)
Dept: PHYSICAL THERAPY | Facility: CLINIC | Age: 75
End: 2025-02-13
Payer: MEDICARE

## 2025-02-13 DIAGNOSIS — M25.562 CHRONIC PAIN OF LEFT KNEE: Primary | ICD-10-CM

## 2025-02-13 DIAGNOSIS — G89.29 CHRONIC PAIN OF LEFT KNEE: Primary | ICD-10-CM

## 2025-02-13 DIAGNOSIS — R29.898 DECREASED STRENGTH OF LOWER EXTREMITY: ICD-10-CM

## 2025-02-13 DIAGNOSIS — M25.662 DECREASED ROM OF LEFT KNEE: ICD-10-CM

## 2025-02-13 PROCEDURE — 97110 THERAPEUTIC EXERCISES: CPT | Mod: GP,CQ

## 2025-02-13 PROCEDURE — 97140 MANUAL THERAPY 1/> REGIONS: CPT | Mod: GP,CQ

## 2025-02-15 DIAGNOSIS — I73.00 RAYNAUD'S PHENOMENON WITHOUT GANGRENE: ICD-10-CM

## 2025-02-15 DIAGNOSIS — I10 HYPERTENSION, ESSENTIAL: ICD-10-CM

## 2025-02-15 DIAGNOSIS — Z87.19 H/O ESOPHAGEAL ULCER: ICD-10-CM

## 2025-02-15 RX ORDER — PANTOPRAZOLE SODIUM 20 MG/1
TABLET, DELAYED RELEASE ORAL
Qty: 90 TABLET | Refills: 3 | Status: SHIPPED | OUTPATIENT
Start: 2025-02-15

## 2025-02-15 RX ORDER — AMLODIPINE BESYLATE 2.5 MG/1
TABLET ORAL
Qty: 90 TABLET | Refills: 3 | Status: SHIPPED | OUTPATIENT
Start: 2025-02-15

## 2025-02-18 ENCOUNTER — TREATMENT (OUTPATIENT)
Dept: PHYSICAL THERAPY | Facility: CLINIC | Age: 75
End: 2025-02-18
Payer: MEDICARE

## 2025-02-18 DIAGNOSIS — R29.898 DECREASED STRENGTH OF LOWER EXTREMITY: ICD-10-CM

## 2025-02-18 DIAGNOSIS — M25.562 CHRONIC PAIN OF LEFT KNEE: Primary | ICD-10-CM

## 2025-02-18 DIAGNOSIS — M25.662 DECREASED ROM OF LEFT KNEE: ICD-10-CM

## 2025-02-18 DIAGNOSIS — G89.29 CHRONIC PAIN OF LEFT KNEE: Primary | ICD-10-CM

## 2025-02-18 PROCEDURE — 97110 THERAPEUTIC EXERCISES: CPT | Mod: GP

## 2025-02-18 NOTE — PROGRESS NOTES
"Physical Therapy Treatment    Patient Name: Carly Cheung  MRN: [id number]  Today's Date: 2/18/2025  Time Calculation  Start Time: 1002  Stop Time: 1045  Time Calculation (min): 43 min     PT Therapeutic Procedures Time Entry  Therapeutic Exercise Time Entry: 43                 Current Problem  1. Chronic pain of left knee        2. Decreased strength of lower extremity        3. Decreased ROM of left knee              Insurance:  Visit number: 6  [employer] COPAY 30 DED 0 , COVERAGE 100 OOP 4150(0) AUTH IS REQ AFTER EVAL TO INCLUDE THIS VISIT RF# [id number]      Precautions    No recent falls reported        Subjective   Subjective:   Pt reports she is doing very good today. She state her pain is 0-1/10 with certain movements. She states she is sleeping better lately and is feeling more rested. She states she can do stairs reciprocally and that she has begun participating in chair yoga.   Pain   1/10    Objective   AAROM L Knee Flexion: 110 deg    AROM -2* ext  Treatments:  MITA: 5 minutes*  Quad sets: 5\" x20  Heel slides 20x 5\"  Hamstring Stretch: 30\" x3  HR/TR: x20  LAQ: 2x10 3s hold  Sit to stands w/o UE x20 RTB for abduction cue   TKE RTB 20x3\"  Stdg Hip PRE's  RTB B 12x ea  Standing banded marches RTB x20  Step ups F/L 4 in 15x ea    Not today:  Seated knee ext stretch with self overpressure 10\"x5--  SLR: x10 --  SL SLR: x10---  Clamshell: x10---  Bridge: 15x---  SAQ x15--  Squats: 2x10---   Calf Stretch: 30\" x3---  PROM L knee , tib mobs, femur mobs, STM to distal quad/ ITB 10'- not today   OP EDUCATION:   Continue with current HEP      Assessment:   Pt tolerated treatment session well today. Pt demonstrated increase in ROM for both flexion and extension. She tolerated increased repetitions for all exercises today with good tolerance. Added RTB around knees for hip abduction cue during STSs. Pt had good tolerance to this modification. Also added standing banded marches. Pt noted " this exercise was most difficult for her. Pt continues to require skilled PT to increase strength and ROM to return to PLOF.     Plan:   Continue to progress strength and ROM as tolerated.

## 2025-02-20 ENCOUNTER — TREATMENT (OUTPATIENT)
Dept: PHYSICAL THERAPY | Facility: CLINIC | Age: 75
End: 2025-02-20
Payer: MEDICARE

## 2025-02-20 DIAGNOSIS — M25.662 DECREASED ROM OF LEFT KNEE: ICD-10-CM

## 2025-02-20 DIAGNOSIS — M25.562 CHRONIC PAIN OF LEFT KNEE: Primary | ICD-10-CM

## 2025-02-20 DIAGNOSIS — R29.898 DECREASED STRENGTH OF LOWER EXTREMITY: ICD-10-CM

## 2025-02-20 DIAGNOSIS — G89.29 CHRONIC PAIN OF LEFT KNEE: Primary | ICD-10-CM

## 2025-02-20 DIAGNOSIS — I10 HYPERTENSION, ESSENTIAL: ICD-10-CM

## 2025-02-20 PROCEDURE — 97110 THERAPEUTIC EXERCISES: CPT | Mod: GP

## 2025-02-20 RX ORDER — LOSARTAN POTASSIUM 25 MG/1
25 TABLET ORAL DAILY
Qty: 90 TABLET | Refills: 3 | Status: SHIPPED | OUTPATIENT
Start: 2025-02-20

## 2025-02-20 NOTE — TELEPHONE ENCOUNTER
"Refill Request      Last OV with PCP 12/9/2024     Future Appointments       Date / Time Provider Department Dept Phone    2/20/2025 10:00 AM Luz Palacio, PT [facility] [phone]    2/25/2025 10:45 AM Luz Palacio, PT [facility] [phone]    2/27/2025 9:15 AM Luz Palacio, PT [facility] [phone]    2/27/2025 11:15 AM (Arrive by 11:00 AM) Gio Lopes MD [facility] [phone]    6/13/2025 8:00 AM (Arrive by 7:45 AM) Ellie Garcia MD  [facility] [phone]          Lab Results   Component Value Date    HGBA1C 5.3 12/06/2024     Lab Results   Component Value Date    CHOL 174 08/13/2024    CHOL 222 (H) 05/21/2024    CHOL 205 (H) 05/03/2023     Lab Results   Component Value Date    HDL 83.1 08/13/2024    HDL 95.4 05/21/2024    .9 05/03/2023     Lab Results   Component Value Date    LDLCALC 78 08/13/2024    LDLCALC 114 (H) 05/21/2024     Lab Results   Component Value Date    TRIG 65 08/13/2024    TRIG 62 05/21/2024    TRIG 61 05/03/2023     No components found for: \"CHOLHDL\"  Lab Results   Component Value Date    TSH 1.27 05/21/2024     Lab Results   Component Value Date    GLUCOSE 88 12/06/2024    CALCIUM 9.6 12/06/2024     (L) 12/06/2024    K 4.3 12/06/2024    CO2 28 12/06/2024     12/06/2024    BUN 15 12/06/2024    CREATININE 0.56 12/06/2024       "

## 2025-02-20 NOTE — PROGRESS NOTES
"Physical Therapy Treatment    Patient Name: Carly Cheung  MRN: [id number]  Today's Date: 2/20/2025  Time Calculation  Start Time: 1000  Stop Time: 1043  Time Calculation (min): 43 min     PT Therapeutic Procedures Time Entry  Therapeutic Exercise Time Entry: 43                 Current Problem  1. Chronic pain of left knee        2. Decreased strength of lower extremity        3. Decreased ROM of left knee              Insurance:  Visit number: 7  [employer] COPAY 30 DED 0 , COVERAGE 100 OOP 4150(0) AUTH IS REQ AFTER EVAL TO INCLUDE THIS VISIT RF# [id number]      Precautions    No recent falls reported        Subjective     Pt reports that she had significant soreness after previous session that lasted most of the following day. She states she feels a little bit better today.      Pain:   2-3/10    Objective   AAROM L Knee Flexion: 106 deg    AROM -2* ext  Treatments:  MITA: 5 minutes*  Quad sets: 5\" x20  Heel slides 20x 5\"  Hamstring Stretch: 30\" x3  HR/TR: x20  LAQ: 2x10 3s hold  Sit to stands w/o UE x12  TKE ball into wall 20x3\"  Stdg Hip PRE's x15ea  Standing marches x20  Step ups F/L 4 in 10x ea  Squats: 2x10  Calf Stretch: 30\" x3    Not today:  Seated knee ext stretch with self overpressure 10\"x5--  SLR: x10 --  SL SLR: x10---  Clamshell: x10---  Bridge: 15x---  SAQ x15--      PROM L knee , tib mobs, femur mobs, STM to distal quad/ ITB 10'- not today   OP EDUCATION:   Continue with current HEP      Assessment:   Pt tolerated treatment session well today. Due to increased soreness after previous session, some modifications were made for today's session. Pt was given more frequent rest breaks and she tolerated those well. Also decreased repetitions and resistance for most exercises to decrease overall load on LE. Pt tolerated these modifications well and reported no soreness at end of session. Pt continues to require skilled PT to increase strength, ROM, and activity tolerance to " return to PLOF.     Plan:   Continue to progress strength, ROM, and activity tolerance as tolerated.

## 2025-02-25 ENCOUNTER — TREATMENT (OUTPATIENT)
Dept: PHYSICAL THERAPY | Facility: CLINIC | Age: 75
End: 2025-02-25
Payer: MEDICARE

## 2025-02-25 DIAGNOSIS — R29.898 DECREASED STRENGTH OF LOWER EXTREMITY: ICD-10-CM

## 2025-02-25 DIAGNOSIS — M25.562 CHRONIC PAIN OF LEFT KNEE: Primary | ICD-10-CM

## 2025-02-25 DIAGNOSIS — M25.662 DECREASED ROM OF LEFT KNEE: ICD-10-CM

## 2025-02-25 DIAGNOSIS — G89.29 CHRONIC PAIN OF LEFT KNEE: Primary | ICD-10-CM

## 2025-02-25 PROCEDURE — 97110 THERAPEUTIC EXERCISES: CPT | Mod: GP

## 2025-02-25 PROCEDURE — 97140 MANUAL THERAPY 1/> REGIONS: CPT | Mod: GP

## 2025-02-25 NOTE — PROGRESS NOTES
"Physical Therapy Treatment    Patient Name: Carly Cheung  MRN: [id number]  Today's Date: 2/25/2025  Time Calculation  Start Time: 1045  Stop Time: 1130  Time Calculation (min): 45 min     PT Therapeutic Procedures Time Entry  Manual Therapy Time Entry: 8  Therapeutic Exercise Time Entry: 37                 Current Problem  1. Chronic pain of left knee        2. Decreased strength of lower extremity        3. Decreased ROM of left knee                Insurance:  Visit number: 8  [employer] COPAY 30 DED 0 , COVERAGE 100 OOP 4150(0) AUTH IS REQ AFTER EVAL TO INCLUDE THIS VISIT RF# [id number]      Precautions    No recent falls reported        Subjective   Pt reports her knee is feeling better today compared to previous session. She reports having a busy weekend that caused some discomfort that has since calmed down. She reports 2/10 pain in her knee.        Pain:   2/10    Objective   AAROM L Knee Flexion: 108 deg    AROM -1* ext  Treatments:  MITA: 6 minutes*  Quad sets: 5\" x20  Heel slides 20x 5\"  Hamstring Stretch: 30\" x3  1/2 foam roll HR/TR: x20  LAQ: 2x10 3s hold  Sit to stands w/o UE x15  Stdg Hip PRE's RTB x15ea ciara  Standing marches RTB x15 ciara  Step ups F/L 6 in 10x ea ciara      Not today:  Seated knee ext stretch with self overpressure 10\"x5--  SLR: x10 --  SL SLR: x10---  Clamshell: x10---  Bridge: 15x---  SAQ x15--  Squats: 2x10--  Calf Stretch: 30\" x3--  TKE ball into wall 20x3\"--    Manual therapy: EOB PROM knee flexion 8min   OP EDUCATION:   Continue with current HEP      Assessment:   Pt tolerated treatment session well today with no increase in pain noted. Pt was feeling better today so increases in repetitions and resistance were made for most exercises. Pt tolerated these changes well with no increase in pain noted. Pt was able to tolerate resisted standing marches well. Pt also tolerated manual therapy well with reported decreased stiffness post treatment. Pt continues " to require skilled PT to increase strength, ROM, and activity tolerance to return to PLOF.       Plan:   Perform pt recheck next appt.  Continue to progress strength, ROM, and activity tolerance as tolerated.

## 2025-02-27 ENCOUNTER — OFFICE VISIT (OUTPATIENT)
Dept: ORTHOPEDIC SURGERY | Facility: CLINIC | Age: 75
End: 2025-02-27
Payer: MEDICARE

## 2025-02-27 ENCOUNTER — TREATMENT (OUTPATIENT)
Dept: PHYSICAL THERAPY | Facility: CLINIC | Age: 75
End: 2025-02-27
Payer: MEDICARE

## 2025-02-27 DIAGNOSIS — G89.29 CHRONIC PAIN OF LEFT KNEE: Primary | ICD-10-CM

## 2025-02-27 DIAGNOSIS — M25.662 DECREASED ROM OF LEFT KNEE: ICD-10-CM

## 2025-02-27 DIAGNOSIS — Z96.652 STATUS POST TOTAL KNEE REPLACEMENT, LEFT: Primary | ICD-10-CM

## 2025-02-27 DIAGNOSIS — M25.562 CHRONIC PAIN OF LEFT KNEE: Primary | ICD-10-CM

## 2025-02-27 DIAGNOSIS — R29.898 DECREASED STRENGTH OF LOWER EXTREMITY: ICD-10-CM

## 2025-02-27 PROCEDURE — 97110 THERAPEUTIC EXERCISES: CPT | Mod: GP

## 2025-02-27 PROCEDURE — 99211 OFF/OP EST MAY X REQ PHY/QHP: CPT | Performed by: ORTHOPAEDIC SURGERY

## 2025-02-27 NOTE — PROGRESS NOTES
History of present illness patient is 6 weeks status post left total knee replacement.  Overall she is doing extremely well.  She is continuing physical therapy.  She is ambulating with a cane.      Physical exam:      General: No acute distress or breathing difficulty or discomfort, pleasant and cooperative with the examination.    Extremities: Left knee incisions clean dry and intact current range of motion is 0 to 110 degrees      Impression: Status post left total knee replacement doing well    Plan: Patient will continue range of motion physical therapy program.  She will follow-up in 6 weeks with x-rays 2 views left knee and range of motion check.

## 2025-02-27 NOTE — PROGRESS NOTES
"Physical Therapy Treatment    Patient Name: Carly Cheung  MRN: [id number]  Today's Date: 2025  Time Calculation  Start Time: 915  Stop Time: 958  Time Calculation (min): 43 min     PT Therapeutic Procedures Time Entry  Therapeutic Exercise Time Entry: 43                 Current Problem  1. Chronic pain of left knee        2. Decreased strength of lower extremity        3. Decreased ROM of left knee                  Insurance:  Visit number: 9  [employer] COPAY 30 DED 0 , COVERAGE 100 OOP 4150(0) AUTH IS REQ AFTER EVAL TO INCLUDE THIS VISIT RF# [id number]      Precautions    No recent falls reported        Subjective   Pt reports she is feeling good today. She states she had some soreness in the evening after previous session but pain subsided the following morning. She reports 2/10 pain in her knee at beginning of session today.        Pain:   2/10    Objective     PALPATION: no TTP in posterior calf  GAIT: SPC for ambulation, antalgic gait, decreased stance time on LLE  OBSERVATION: no signs of infection, wound is healing well, mild edema and bruising around inscions                                            AROM  Right knee flexion: 115   Left knee flexion: 106, 108 after heel slides AAROM  Right knee extension: -4    Left knee extension: -5, -2 after quad set              MMT  Right quadriceps: 5    Left quadriceps: 4+  Right iliopsoas: 4+    Left iliopsoas: 4+  Right gluteus medius: 4+    Left gluteus medius: 4+  Right hip adductors: 5    Left hip adductors: 4+  Right hamstrin   Left hamstrin   Outcome Measures:    LEFS: 68/80  Treatments:  MITA: 6 minutes*  Updated objective measurements and reviewed goal progression   Quad sets: 5\" x20  Heel slides 20x 5\"  LAQ: 2x10 3s hold  Sit to stands w/o and RTB for hip abd cue UE x10  Stdg Hip PRE's RTB x10ea ciara  SL balance 10s x5    Resume NV  Hamstring Stretch: 30\" x3  1/2 foam roll HR/TR: x20  Standing marches RTB " "x15 ciara  Step ups F/L 6 in 10x ea ciara    Not today:  Seated knee ext stretch with self overpressure 10\"x5--  SLR: x10 --  SL SLR: x10---  Clamshell: x10---  Bridge: 15x---  SAQ x15--  Squats: 2x10--  Calf Stretch: 30\" x3--  TKE ball into wall 20x3\"--    Manual therapy: EOB PROM knee flexion 8min   OP EDUCATION/HEP:   Access Code: [email or web address]  URL: [email or web address]  Date: 02/27/2025  Prepared by: Luz Palacio    Exercises  - Single Leg Stance with Support  - 1 x daily - 4 x weekly - 2 sets - 5 reps - 10s hold      Assessment:   Today's session focused on updating pt objective measures and treatment. Pt tolerated all treatments well with no increase in pain noted. Introduced SL balance exercise to good tolerance. Pt also provided updated HEP. Pt demonstrated improvements in strength, ROM, and balance, but she is still progressing towards those goals set for her at initial evaluation. Pt also demonstrated significant increase in LEFS from initial evaluation. Pt can continue to improve her strength, ROM, balance, and functional mobility to meet remaining goals. Pt educated on HEP and rationale for updated POC moving forward and what will be focused on during those visits. Pt demonstrated good understanding of all education. It is recommended pt continue therpay 1x/ wk for 4 more wks to improve strength, ROM, balance, and functional mobility in order to achieve remaining goals and return to PLOF.       Plan:  It is recommended pt continue therpay 1x/ wk for 4 more wks to improve strength, ROM, balance, and functional mobility in order to achieve remaining goals and return to PLOF.     Goals (progressing):  1. Pt is independent w/advanced HEP-progressing   2. L LE grossly 4+-5/5 including good eccentric l quad to perform all functional mobility-progressing   3. perform sit<>stand no UE assist from low surface for max functional mobility-met  4. L LE SLS >/=30 sec no UE assist on compliant " surface without UE assist for functional carryover into dynamic balance-progressing   5. Pt will be able to ambulate community distance with no deficits over varying surfaces/inclines independent without AD or increased pain L LE-progressing   6. Pt will increase L knee flexion AROM to at least 115 deg to match contralateral knee and allow for optimal functional mobility. -progressing

## 2025-03-11 ENCOUNTER — TREATMENT (OUTPATIENT)
Dept: PHYSICAL THERAPY | Facility: CLINIC | Age: 75
End: 2025-03-11
Payer: MEDICARE

## 2025-03-11 DIAGNOSIS — M25.662 DECREASED ROM OF LEFT KNEE: ICD-10-CM

## 2025-03-11 DIAGNOSIS — R29.898 DECREASED STRENGTH OF LOWER EXTREMITY: ICD-10-CM

## 2025-03-11 DIAGNOSIS — M25.562 CHRONIC PAIN OF LEFT KNEE: Primary | ICD-10-CM

## 2025-03-11 DIAGNOSIS — G89.29 CHRONIC PAIN OF LEFT KNEE: Primary | ICD-10-CM

## 2025-03-11 PROCEDURE — 97110 THERAPEUTIC EXERCISES: CPT | Mod: GP,CQ

## 2025-03-11 ASSESSMENT — PAIN - FUNCTIONAL ASSESSMENT: PAIN_FUNCTIONAL_ASSESSMENT: 0-10

## 2025-03-11 ASSESSMENT — PAIN SCALES - GENERAL: PAINLEVEL_OUTOF10: 2

## 2025-03-11 ASSESSMENT — PAIN DESCRIPTION - DESCRIPTORS: DESCRIPTORS: ACHING

## 2025-03-11 NOTE — PROGRESS NOTES
"Physical Therapy Treatment    Patient Name: Carly Cheung  MRN: [id number]  Today's Date: 3/11/2025  Time Calculation  Start Time: 0816  Stop Time: 0902  Time Calculation (min): 46 min    Insurance  Visit number: 10/12  [employer] COPAY 30 DED 0 , COVERAGE 100 OOP 4150(0) AUTH IS REQ AFTER EVAL TO INCLUDE THIS VISIT  [employer] APPROVED  12 PT VISITS  1-24-25 THRU 4-23-25      Current Problem  1. Chronic pain of left knee        2. Decreased strength of lower extremity        3. Decreased ROM of left knee            Subjective    General   Pt reports her L knee has been doing well & improving.  She reports her R hip has been bothering her the most lately.  States she will be going in to pain management next week for a \"small procedure on my back\".  Reports there will be a small incision made, but no sutures with be needed.  She is hoping this will help with R hip discomfort.     Precautions  Precautions  Precautions Comment: No recent falls reported     Pain  Pain Assessment  Pain Assessment: 0-10  0-10 (Numeric) Pain Score: 2  Pain Location: Knee  Pain Orientation: Left  Pain Radiating Towards: none reported  Pain Descriptors: Aching  Pain Frequency: Intermittent  Clinical Progression: Gradually improving  Effect of Pain on Daily Activities: Walking, Standing>15 min, Negotiating Stairs, Sleep, Self-Care activities, Household activities       Objective   AAROM L Knee Flexion: 116 deg    GAIT: SPC for ambulation, antalgic gait, decreased stance time on LLE     Treatments:  Therapeutic Exercises (43270): 43 Minutes, 3 Units    Activities:  MITA: 6 minutes*  Hamstring Stretch: 30\" x3  Hip Flexor Stretch: 30\" x3  Quad sets: 5\" x20  Heel slides 20x 5\"  LAQ: 2x10 3s hold, eccentric return  Sit to stands w/o and RTB for hip abd cue UE x10 (not this visit)  Stdg Hip PRE's GTB 2x10ea KENJI  Standing marches GTB x20  SL balance 10s x5 KENJI     RESUME NV:  Step ups F/L 6 in 10x ea kenji     Not today:  Seated knee " "ext stretch with self overpressure 10\"x5--  1/2 foam roll HR/TR: x20  SLR: x10 --  SL SLR: x10---  Clamshell: x10---  Bridge: 15x---  SAQ x15--  Squats: 2x10--  Calf Stretch: 30\" x3--  TKE ball into wall 20x3\"--     Manual therapy: EOB PROM knee flexion 8min (not this visit)    Assessment:   Pt continues with current activities, progressing as noted to increase ROM, strength in L knee.  She is making good progress with rehab POC.  Exhibits improved ROM following heel slide this visit, increasing to 116 deg of knee flexion.  Progressed resistance of TB with stdg hip PRE's & stdg march to green this visit for further strengthening.  All activities tolerated well.  Normal muscle soreness, fatigue reported post activity.  Demonstrates good ability to continue progressing with activities as tolerated.    Plan:   Pt has 2 visits remaining on current authorization/POC.  Continue to progress with activities as tolerated to improve strength, ROM, balance, and functional mobility in order to achieve remaining goals and return to PLOF.     "

## 2025-03-17 DIAGNOSIS — E78.5 HYPERLIPIDEMIA, UNSPECIFIED HYPERLIPIDEMIA TYPE: ICD-10-CM

## 2025-03-17 RX ORDER — ROSUVASTATIN CALCIUM 10 MG/1
TABLET, COATED ORAL
Qty: 36 TABLET | Refills: 3 | Status: SHIPPED | OUTPATIENT
Start: 2025-03-17

## 2025-03-18 ENCOUNTER — TREATMENT (OUTPATIENT)
Dept: PHYSICAL THERAPY | Facility: CLINIC | Age: 75
End: 2025-03-18
Payer: MEDICARE

## 2025-03-18 DIAGNOSIS — R29.898 DECREASED STRENGTH OF LOWER EXTREMITY: ICD-10-CM

## 2025-03-18 DIAGNOSIS — G89.29 CHRONIC PAIN OF LEFT KNEE: Primary | ICD-10-CM

## 2025-03-18 DIAGNOSIS — M25.662 DECREASED ROM OF LEFT KNEE: ICD-10-CM

## 2025-03-18 DIAGNOSIS — M25.562 CHRONIC PAIN OF LEFT KNEE: Primary | ICD-10-CM

## 2025-03-18 PROCEDURE — 97110 THERAPEUTIC EXERCISES: CPT | Mod: GP,CQ

## 2025-03-18 ASSESSMENT — PAIN SCALES - GENERAL: PAINLEVEL_OUTOF10: 1

## 2025-03-18 ASSESSMENT — PAIN - FUNCTIONAL ASSESSMENT: PAIN_FUNCTIONAL_ASSESSMENT: 0-10

## 2025-03-18 ASSESSMENT — PAIN DESCRIPTION - DESCRIPTORS: DESCRIPTORS: ACHING;DULL

## 2025-03-18 NOTE — PROGRESS NOTES
"Physical Therapy Treatment    Patient Name: Carly Cheung  MRN: [id number]  Today's Date: 3/18/2025  Time Calculation  Start Time: 0817  Stop Time: 0911  Time Calculation (min): 54 min    Insurance  Visit number: 11/12  [employer] COPAY 30 DED 0 , COVERAGE 100 OOP 4150(0) AUTH IS REQ AFTER EVAL TO INCLUDE THIS VISIT  [employer] APPROVED  12 PT VISITS  1-24-25 THRU 4-23-25       Current Problem  1. Chronic pain of left knee        2. Decreased strength of lower extremity        3. Decreased ROM of left knee            Subjective    General   \"I'm so thrilled with my knee.\"  Pt reports going out over the weekend to a few events & did not take her cane with her.  States she did very well & \"did not put in the closet yet\" but was \"cane free\" for most of the weekend.  She reports improved capacity to negotiate stairs & states she was able to go up/down stairs several times yesterday to do several loads of laundry.     Precautions  Precautions  Precautions Comment: No recent falls reported    Pain  Pain Assessment  Pain Assessment: 0-10  0-10 (Numeric) Pain Score: 1  Pain Location: Knee  Pain Orientation: Left  Pain Radiating Towards: none reported  Pain Descriptors: Aching, Dull  Pain Frequency: Intermittent  Clinical Progression: Gradually improving  Effect of Pain on Daily Activities: Walking, Standing>45 min-1 hour, Negotiating Stairs, Sleep, Self-Care activities, Household activities (Improving)    Objective   No assistive device used for ambulation this visit    AAROM L Knee Flexion: 120 deg    Treatments:  Therapeutic Exercises (72716): 50 Minutes, 3 Units    Activities:  MITA: 6 minutes  Hamstring Stretch: 30\" x3  Hip Flexor Stretch: 30\" x3  Quad sets: 5\" x20  Heel slides 20x 5\"  Bridge: 2x10  LAQ: 2x10 3s hold, eccentric return  Sit to stands w/o and RTB for hip abd cue UE x10 (not this visit)  Stdg Hip PRE's GTB 2x10ea KENJI  Standing marches GTB x20  SL balance 10s x5 KENJI  Step ups F/L 8in 2x10 ea " "ciara     Not today:  Seated knee ext stretch with self overpressure 10\"x5--  1/2 foam roll HR/TR: x20  SLR: x10 --  SL SLR: x10---  Clamshell: x10---  SAQ x15--  Squats: 2x10--  Calf Stretch: 30\" x3--  TKE ball into wall 20x3\"--     Manual therapy: EOB PROM knee flexion 8min (not this visit)     Assessment:   Pt is doing very well with rehab POC & progressing toward goals.  She exhibits increase in overall strength, stability which is increasing functional mobility & capacity for daily activities, as she has reported.  She completes activities today with good tolerance.  Able to resume step activities & progress step to 8 inch step with both fwd, lat step up.  ROM also increased this visit to 120 deg of knee flexion during heel slide.  Exhibits good ability to continue progressing with activities as tolerated to increase functional strength, stability & overall mobility.    Plan:    Pt has 1 visit remaining on current authorization/POC.  Re-check at next visit.  Continue to progress with activities as tolerated to improve strength, ROM, balance, and functional mobility in order to achieve remaining goals and return to PLOF.  Prepare for probable discharge to independent HEP.    "

## 2025-03-25 ENCOUNTER — APPOINTMENT (OUTPATIENT)
Dept: PHYSICAL THERAPY | Facility: CLINIC | Age: 75
End: 2025-03-25
Payer: MEDICARE

## 2025-04-01 ENCOUNTER — TREATMENT (OUTPATIENT)
Dept: PHYSICAL THERAPY | Facility: CLINIC | Age: 75
End: 2025-04-01
Payer: MEDICARE

## 2025-04-01 DIAGNOSIS — R29.898 DECREASED STRENGTH OF LOWER EXTREMITY: ICD-10-CM

## 2025-04-01 DIAGNOSIS — M25.662 DECREASED ROM OF LEFT KNEE: ICD-10-CM

## 2025-04-01 DIAGNOSIS — G89.29 CHRONIC PAIN OF LEFT KNEE: Primary | ICD-10-CM

## 2025-04-01 DIAGNOSIS — M25.562 CHRONIC PAIN OF LEFT KNEE: Primary | ICD-10-CM

## 2025-04-01 PROCEDURE — 97110 THERAPEUTIC EXERCISES: CPT | Mod: GP

## 2025-04-01 NOTE — PROGRESS NOTES
Physical Therapy Treatment    Patient Name: Carly Cheung  MRN: [id number]  Today's Date: 2025  Time Calculation  Start Time: 0832  Stop Time: 0900  Time Calculation (min): 28 min    Insurance  Visit number:   [employer] COPAY 30 DED 0 , COVERAGE 100 OOP 4150(0) AUTH IS REQ AFTER EVAL TO INCLUDE THIS VISIT  [employer] APPROVED  12 PT VISITS  25 THRU 25       Current Problem  1. Chronic pain of left knee        2. Decreased strength of lower extremity        3. Decreased ROM of left knee              Subjective    General  Pt states she is overall doing very well and she is happy with how everything is going with her knee. She states she had a procedure on her low back which has also helped with her pain.      Precautions   L TKA    Pain   0/10    Objective   No assistive device used for ambulation this visit       AROM  Right knee flexion: 115   Left knee flexion:122  Right knee extension: -4    Left knee extension: 0            MMT  Right quadriceps: 5    Left quadriceps: 4+  Right iliopsoas: 4+    Left iliopsoas: 5  Right gluteus medius: 4+    Left gluteus medius: 4+  Right hip adductors: 5    Left hip adductors: 5  Right hamstrin   Left hamstrin+    LEFS 68/80    Treatments:  Therex:  MITA: 6 minutes  Goal review/objective measurements taken  Flight of stairs x1  STS x10  SLS       Assessment:  Recheck performed this visit with pt demonstrating improvement in ROM, strength, and functional mobility. ROM improved to 0-122 which is an improvement from 10-82 at initial evaluation. Pt now able to navigate stairs reciprocally demonstrating improvement in functional strength at this time. Overall pt has good understanding of HEP and how to continue on independently. She will be placed on 30 day hold with HEP to continue on independently at this time. Pt in agreement with this plan.    Plan:  Pt to be placed on 30 day hold with HEP as she continues independently.  Encouraged to call with questions or concerns.    Goals:  1. Pt is independent w/advanced HEP Met  2. L LE grossly 4+-5/5 including good eccentric l quad to perform all functional mobility MET  3. perform sit<>stand no UE assist from low surface for max functional mobility MET  4. L LE SLS >/=30 sec no UE assist on compliant surface without UE assist for functional carryover into dynamic balance - progress made  5. Pt will be able to ambulate community distance with no deficits over varying surfaces/inclines independent without AD or increased pain L LE MET  6. Pt will increase L knee flexion AROM to at least 115 deg to match contralateral knee and allow for optimal functional mobility.  MET

## 2025-04-10 ENCOUNTER — OFFICE VISIT (OUTPATIENT)
Dept: ORTHOPEDIC SURGERY | Facility: CLINIC | Age: 75
End: 2025-04-10
Payer: MEDICARE

## 2025-04-10 ENCOUNTER — HOSPITAL ENCOUNTER (OUTPATIENT)
Dept: RADIOLOGY | Facility: CLINIC | Age: 75
Discharge: HOME | End: 2025-04-10
Payer: MEDICARE

## 2025-04-10 DIAGNOSIS — Z96.652 STATUS POST TOTAL KNEE REPLACEMENT, LEFT: Primary | ICD-10-CM

## 2025-04-10 DIAGNOSIS — M25.562 LEFT KNEE PAIN, UNSPECIFIED CHRONICITY: ICD-10-CM

## 2025-04-10 PROCEDURE — 73560 X-RAY EXAM OF KNEE 1 OR 2: CPT | Mod: LT

## 2025-04-10 PROCEDURE — 99213 OFFICE O/P EST LOW 20 MIN: CPT | Performed by: ORTHOPAEDIC SURGERY

## 2025-04-10 PROCEDURE — 1123F ACP DISCUSS/DSCN MKR DOCD: CPT | Performed by: ORTHOPAEDIC SURGERY

## 2025-04-10 NOTE — PROGRESS NOTES
History of present illness: Total knee replacement just over 3 months out doing well    Physical exam:    General: No acute distress or breathing difficulty or discomfort, pleasant and cooperative with the examination.    Extremities: Knee incision clean and dry    Good straight leg raise    Full extension    Flexion 135    No instability at mid flexion in either side-to-side or anterior posterior drawer plane    She can straight leg raise extensor maxim intact    She can plantarflex/ dorsiflex toes foot and ankle otherwise doing well gets a little patellofemoral crepitus or irritation      Incision remains well-healed ecchymosis bruising swelling edema is all well-controlled and is dissipated there is no calf swelling.  Quad tendon control is excellent.    Diagnostic studies: X-rays show a well aligned well-positioned left total knee    Impression: Doing well after left total knee replacement correction of alignment has helped her foot she has posterior tibial tendon insufficiency bilaterally and has AFO braces    Plan: Follow-up 1 to 2 years for maintenance x-rays evaluation

## 2025-06-02 DIAGNOSIS — E78.2 MIXED HYPERLIPIDEMIA: ICD-10-CM

## 2025-06-02 DIAGNOSIS — I10 HYPERTENSION, ESSENTIAL: ICD-10-CM

## 2025-06-02 DIAGNOSIS — E87.1 HYPONATREMIA: ICD-10-CM

## 2025-06-02 DIAGNOSIS — E55.9 VITAMIN D DEFICIENCY: ICD-10-CM

## 2025-06-12 PROBLEM — Z86.718 PERSONAL HISTORY OF OTHER VENOUS THROMBOSIS AND EMBOLISM: Status: ACTIVE | Noted: 2024-05-31

## 2025-06-13 ENCOUNTER — APPOINTMENT (OUTPATIENT)
Dept: PRIMARY CARE | Facility: CLINIC | Age: 75
End: 2025-06-13
Payer: MEDICARE

## 2025-06-13 VITALS
WEIGHT: 132.2 LBS | HEIGHT: 63 IN | BODY MASS INDEX: 23.42 KG/M2 | DIASTOLIC BLOOD PRESSURE: 73 MMHG | SYSTOLIC BLOOD PRESSURE: 123 MMHG | TEMPERATURE: 97.9 F | OXYGEN SATURATION: 98 % | HEART RATE: 86 BPM

## 2025-06-13 DIAGNOSIS — K21.9 GERD WITHOUT ESOPHAGITIS: ICD-10-CM

## 2025-06-13 DIAGNOSIS — Z12.31 ENCOUNTER FOR SCREENING MAMMOGRAM FOR BREAST CANCER: ICD-10-CM

## 2025-06-13 DIAGNOSIS — Z87.19 H/O ESOPHAGEAL ULCER: ICD-10-CM

## 2025-06-13 DIAGNOSIS — Z71.89 ADVANCED DIRECTIVES, COUNSELING/DISCUSSION: ICD-10-CM

## 2025-06-13 DIAGNOSIS — Z12.11 SCREEN FOR COLON CANCER: ICD-10-CM

## 2025-06-13 DIAGNOSIS — E78.5 HYPERLIPIDEMIA, UNSPECIFIED HYPERLIPIDEMIA TYPE: ICD-10-CM

## 2025-06-13 DIAGNOSIS — Z00.00 ROUTINE ADULT HEALTH MAINTENANCE: Primary | ICD-10-CM

## 2025-06-13 DIAGNOSIS — E55.9 VITAMIN D DEFICIENCY: ICD-10-CM

## 2025-06-13 DIAGNOSIS — Z71.89 CARDIAC RISK COUNSELING: ICD-10-CM

## 2025-06-13 DIAGNOSIS — I73.00 RAYNAUD'S PHENOMENON WITHOUT GANGRENE: ICD-10-CM

## 2025-06-13 DIAGNOSIS — Z13.9 ENCOUNTER FOR SCREENING INVOLVING SOCIAL DETERMINANTS OF HEALTH (SDOH): ICD-10-CM

## 2025-06-13 DIAGNOSIS — E87.1 HYPONATREMIA: ICD-10-CM

## 2025-06-13 DIAGNOSIS — R93.1 ABNORMAL SCREENING CARDIAC CT: ICD-10-CM

## 2025-06-13 DIAGNOSIS — I10 HYPERTENSION, ESSENTIAL: ICD-10-CM

## 2025-06-13 DIAGNOSIS — Z78.0 MENOPAUSE: ICD-10-CM

## 2025-06-13 DIAGNOSIS — M85.9 DISORDER OF BONE DENSITY AND STRUCTURE, UNSPECIFIED: ICD-10-CM

## 2025-06-13 DIAGNOSIS — Z86.718 PERSONAL HISTORY OF OTHER VENOUS THROMBOSIS AND EMBOLISM: ICD-10-CM

## 2025-06-13 DIAGNOSIS — J30.9 ALLERGIC RHINITIS, UNSPECIFIED SEASONALITY, UNSPECIFIED TRIGGER: ICD-10-CM

## 2025-06-13 DIAGNOSIS — G57.01 PIRIFORMIS SYNDROME, RIGHT: ICD-10-CM

## 2025-06-13 PROBLEM — M25.562 CHRONIC PAIN OF LEFT KNEE: Status: RESOLVED | Noted: 2025-02-05 | Resolved: 2025-06-13

## 2025-06-13 PROBLEM — M17.12 PRIMARY OSTEOARTHRITIS OF LEFT KNEE: Status: RESOLVED | Noted: 2024-12-07 | Resolved: 2025-06-13

## 2025-06-13 PROBLEM — M25.662 DECREASED ROM OF LEFT KNEE: Status: RESOLVED | Noted: 2025-02-05 | Resolved: 2025-06-13

## 2025-06-13 PROBLEM — G89.29 CHRONIC PAIN OF LEFT KNEE: Status: RESOLVED | Noted: 2025-02-05 | Resolved: 2025-06-13

## 2025-06-13 PROBLEM — R29.898 DECREASED STRENGTH OF LOWER EXTREMITY: Status: RESOLVED | Noted: 2025-02-05 | Resolved: 2025-06-13

## 2025-06-13 RX ORDER — LOSARTAN POTASSIUM 25 MG/1
25 TABLET ORAL DAILY
Qty: 90 TABLET | Refills: 3 | Status: SHIPPED | OUTPATIENT
Start: 2025-06-13

## 2025-06-13 RX ORDER — PANTOPRAZOLE SODIUM 20 MG/1
20 TABLET, DELAYED RELEASE ORAL
Qty: 90 TABLET | Refills: 3 | Status: SHIPPED | OUTPATIENT
Start: 2025-06-13

## 2025-06-13 RX ORDER — AMLODIPINE BESYLATE 2.5 MG/1
2.5 TABLET ORAL DAILY
Qty: 90 TABLET | Refills: 3 | Status: SHIPPED | OUTPATIENT
Start: 2025-06-13

## 2025-06-13 RX ORDER — ROSUVASTATIN CALCIUM 10 MG/1
TABLET, COATED ORAL
Qty: 36 TABLET | Refills: 3 | Status: SHIPPED | OUTPATIENT
Start: 2025-06-13

## 2025-06-13 SDOH — ECONOMIC STABILITY: INCOME INSECURITY: IN THE LAST 12 MONTHS, WAS THERE A TIME WHEN YOU WERE NOT ABLE TO PAY THE MORTGAGE OR RENT ON TIME?: NO

## 2025-06-13 SDOH — ECONOMIC STABILITY: TRANSPORTATION INSECURITY
IN THE PAST 12 MONTHS, HAS LACK OF TRANSPORTATION KEPT YOU FROM MEETINGS, WORK, OR FROM GETTING THINGS NEEDED FOR DAILY LIVING?: NO

## 2025-06-13 SDOH — ECONOMIC STABILITY: FOOD INSECURITY: WITHIN THE PAST 12 MONTHS, YOU WORRIED THAT YOUR FOOD WOULD RUN OUT BEFORE YOU GOT MONEY TO BUY MORE.: NEVER TRUE

## 2025-06-13 SDOH — ECONOMIC STABILITY: FOOD INSECURITY: WITHIN THE PAST 12 MONTHS, THE FOOD YOU BOUGHT JUST DIDN'T LAST AND YOU DIDN'T HAVE MONEY TO GET MORE.: NEVER TRUE

## 2025-06-13 SDOH — ECONOMIC STABILITY: TRANSPORTATION INSECURITY
IN THE PAST 12 MONTHS, HAS THE LACK OF TRANSPORTATION KEPT YOU FROM MEDICAL APPOINTMENTS OR FROM GETTING MEDICATIONS?: NO

## 2025-06-13 ASSESSMENT — PATIENT HEALTH QUESTIONNAIRE - PHQ9
1. LITTLE INTEREST OR PLEASURE IN DOING THINGS: NOT AT ALL
SUM OF ALL RESPONSES TO PHQ9 QUESTIONS 1 AND 2: 0
2. FEELING DOWN, DEPRESSED OR HOPELESS: NOT AT ALL

## 2025-06-13 ASSESSMENT — PAIN SCALES - GENERAL: PAINLEVEL_OUTOF10: 2

## 2025-06-13 ASSESSMENT — SOCIAL DETERMINANTS OF HEALTH (SDOH): IN THE PAST 12 MONTHS, HAS THE ELECTRIC, GAS, OIL, OR WATER COMPANY THREATENED TO SHUT OFF SERVICE IN YOUR HOME?: NO

## 2025-06-13 NOTE — PROGRESS NOTES
Annual Medicare Wellness Exam/Comprehensive Problem Focused Follow Up  HPI/CC  Chief Complaint   Patient presents with    Medicare Annual Wellness Visit Subsequent     Ask about a Cologuard instead of colonoscopy  Advise for bursitis      Reviewed chart for care received since last appointment.    July 2044:  1. Coronary artery calcium score of 97*.  2. JEFFREY 62nd percentile** for age, gender, and race in asymptomatic  patients.    Labs reviewed:  Component      Latest Ref Rng 6/5/2025   WHITE BLOOD CELL COUNT      3.8 - 10.8 Thousand/uL 4.9    RED BLOOD CELL COUNT      3.80 - 5.10 Million/uL 4.24    HEMOGLOBIN      11.7 - 15.5 g/dL 12.4    HEMATOCRIT      35.0 - 45.0 % 39.2    MCV      80.0 - 100.0 fL 92.5    MCH      27.0 - 33.0 pg 29.2    MCHC      32.0 - 36.0 g/dL 31.6 (L)    RDW      11.0 - 15.0 % 13.1    PLATELET COUNT      140 - 400 Thousand/uL 345    MPV      7.5 - 12.5 fL 10.2    ABSOLUTE NEUTROPHILS      1,500 - 7,800 cells/uL 2,891    ABSOLUTE LYMPHOCYTES      850 - 3,900 cells/uL 1,392    ABSOLUTE MONOCYTES      200 - 950 cells/uL 451    ABSOLUTE EOSINOPHILS      15 - 500 cells/uL 118    ABSOLUTE BASOPHILS      0 - 200 cells/uL 49    NEUTROPHILS      % 59    LYMPHOCYTES      % 28.4    MONOCYTES      % 9.2    EOSINOPHILS      % 2.4    BASOPHILS      % 1.0    COLOR      YELLOW  YELLOW    APPEARANCE      CLEAR  CLEAR    SPECIFIC GRAVITY      1.001 - 1.035  1.006    PH      5.0 - 8.0  7.5    GLUCOSE      NEGATIVE  NEGATIVE    BILIRUBIN      NEGATIVE  NEGATIVE    KETONES      NEGATIVE  NEGATIVE    OCCULT BLOOD      NEGATIVE  NEGATIVE    PROTEIN      NEGATIVE  NEGATIVE    NITRITE      NEGATIVE  NEGATIVE    LEUKOCYTE ESTERASE      NEGATIVE  1+ !    WBC      < OR = 5 /HPF NONE SEEN    RBC      < OR = 2 /HPF NONE SEEN    SQUAMOUS EPITHELIAL CELLS      < OR = 5 /HPF 0-5    BACTERIA      NONE SEEN /HPF NONE SEEN    HYALINE CAST      NONE SEEN /LPF NONE SEEN    NOTE --    GLUCOSE      65 - 99 mg/dL 92    UREA  NITROGEN (BUN)      7 - 25 mg/dL 15    CREATININE      0.60 - 1.00 mg/dL 0.56 (L)    EGFR      > OR = 60 mL/min/1.73m2 95    SODIUM      135 - 146 mmol/L 134 (L)    POTASSIUM      3.5 - 5.3 mmol/L 4.5    CHLORIDE      98 - 110 mmol/L 99    CARBON DIOXIDE      20 - 32 mmol/L 27    ELECTROLYTE BALANCE      7 - 17 mmol/L (calc) 8    CALCIUM      8.6 - 10.4 mg/dL 9.8    PROTEIN, TOTAL      6.1 - 8.1 g/dL 7.2    ALBUMIN      3.6 - 5.1 g/dL 4.7    BILIRUBIN, TOTAL      0.2 - 1.2 mg/dL 0.7    ALKALINE PHOSPHATASE      37 - 153 U/L 92    AST      10 - 35 U/L 21    ALT      6 - 29 U/L 12    CHOLESTEROL, TOTAL      <200 mg/dL 217 (H)    HDL CHOLESTEROL      > OR = 50 mg/dL 102    TRIGLYCERIDES      <150 mg/dL 69    LDL-CHOLESTEROL      mg/dL (calc) 99    CHOL/HDLC RATIO      <5.0 (calc) 2.1    NON HDL CHOLESTEROL      <130 mg/dL (calc) 115    CREATININE, RANDOM URINE      20 - 275 mg/dL 20    ALBUMIN, URINE      See Note: mg/dL <0.2    ALBUMIN/CREATININE RATIO, RANDOM URINE      <30 mg/g creat NOTE    VITAMIN D,25-OH,TOTAL,IA      30 - 100 ng/mL 41    TSH      0.40 - 4.50 mIU/L 1.41    VITAMIN B12      200 - 1,100 pg/mL 812    OSMOLALITY (SERUM)      278 - 305 mOsm/kg 285       On statin 3x/week  Having pain in her hip, was told she has bursitis  Stops her in her tracks  Declined increase in statin, wants to exercise more  Point to her buttocks area    Assessment and Plan:  Problem List Items Addressed This Visit          High    Routine adult health maintenance - Primary    Overview   Pfizer COVID-19 vaccine 2/27/21, 3/27/21, 10/6/21  Influenza vaccine 11/18/19, 10/20/20, 10/1/21, 10/1/22, 10/30/23, 12/16/24  RSV 12/29/23  TDAP 1/24/14, 5/116/23  Shingrix 5/26/21, 8/9/21  Prevnar 7 3/27/15  Pneumovax 23 12/3/20  Prevnar 20 5/31/24  Zostavax 5/4/15  Mamm 6/18/18, 8/19; 9/3/20; 9/20/21; 9/22/22, 10/6/23  BMD 2005; 12/19, 12/20/21, 10/6/23  Cscope 2015 (5yrs); 1/20 (5yrs)  s/p TAHBSO  4/22/22 Current 10-Year ASCVD Risk:  21.65%  - High Risk         Current Assessment & Plan   Annual Wellness exam completed   Preventive Health History reviewed  Ordered:   Labs    Mammogram   BMD   Cologuard              Medium    Abnormal screening cardiac CT    Overview   2018: CT calcium score =19  LDL was 143 at the time  7/3/24: 1. Coronary artery calcium score of 97 (LAD)  2. JEFFREY 62nd percentile** for age, gender, and race in asymptomatic patients.  On statin , Goal LDL 70         Advanced directives, counseling/discussion    Overview   06/13/25  Cipriano Mckeon () is her HCPOA  FULL CODE. No extraordinary measures desired if post code QOL is not acceptable upon ROSC         Allergic rhinitis    Overview   On Zyrtec         Cardiac risk counseling    Overview   06/13/25Current 10-Year ASCVD Risk: 19.9% - High Risk   ASA not rec;d per updated sammi (also hx esophageal ulcer)         Disorder of bone density and structure, unspecified    Overview   BMD 10/23: Lowest T score -1.9.   10-year Fracture Risk:   Major Osteoporotic Fracture 12.2%   Hip Fracture 2.9%          Current Assessment & Plan   Due for BMD         Encounter for screening involving social determinants of health (SDoH) [Z13.9]    Overview   06/13/25: 5 minutes spent on SDOH screening.  Specifically: Housing, Food Insecurity, Utilities and Transportatin Needs were evaluated   (See Screenings in Rooming section for documentation)           Encounter for screening mammogram for breast cancer    Relevant Orders    BI mammo bilateral screening tomosynthesis    GERD without esophagitis    Overview   Controlled with PPI  EGD 10/15: LA grade C reflux esophagitis            2/16: normal         H/O esophageal ulcer    Overview   Managed by GI. Contiue PPI.   found on EGD by  Dr Connell -  EGD X 3 -  bleeding esophageal lesion - EGD every two years recommneded.         Relevant Medications    pantoprazole (ProtoNix) 20 mg EC tablet    Hyperlipidemia    Overview   2018: CT calcium score  "=19   at the time  Goal LDL <70   on statin thrice weekly   Used to take Tricor  AE with Zocor (tolerated Crestor once weekly)  She dosnt want to increase statin frequency         Relevant Medications    rosuvastatin (Crestor) 10 mg tablet    Other Relevant Orders    TSH with reflex to Free T4 if abnormal    Hypertension, essential    Overview   2/2020:    home cuff: 139/81 p 61   office cuff: 138/82 p 60;   Goal BP <130/80   Lisinopril caused cough, but tolerable was d'c due to low BP with HCTZ;   HCTZ caused hyponatremia  On CCB and ARB (AE on 50mg Losartan, LH/imbalance)   Hyponatremia likely due to one of these meds         Relevant Medications    amLODIPine (Norvasc) 2.5 mg tablet    losartan (Cozaar) 25 mg tablet    Other Relevant Orders    Comprehensive Metabolic Panel    CBC and Auto Differential    Lipid Panel    Urinalysis with Reflex Microscopic    Albumin-Creatinine Ratio, Urine Random    Hyponatremia    Overview   5/23: 130 while on HCTZ  5/24: 134  ? Due to ARB  Monitor         Menopause    Relevant Orders    XR DEXA bone density    Personal history of other venous thrombosis and embolism    Overview   H/O Retinal Vein Occlision  Sees ophtho         Raynaud's phenomenon without gangrene    Relevant Medications    amLODIPine (Norvasc) 2.5 mg tablet    Screen for colon cancer    Relevant Orders    Cologuard® colon cancer screening    Vitamin D deficiency    Overview   Was deficient in the past, level unknown  Goal 40-50  On supplement         Relevant Orders    Vitamin D 25-Hydroxy,Total (for eval of Vitamin D levels)     Other Visit Diagnoses         Piriformis syndrome, right        suspect this is cause for he rpain  will try PT +/- chiro    Relevant Orders    Referral to Physical Therapy            ROS otherwise negative aside from what was mentioned above in HPI.    Vitals  /73   Pulse 86   Temp 36.6 °C (97.9 °F)   Ht 1.6 m (5' 3\")   Wt 60 kg (132 lb 3.2 oz)   SpO2 98%   BMI 23.42 " kg/m²   Body mass index is 23.42 kg/m².  Physical Exam  Gen: Alert, NAD  HEENT:  Unremarkable  Neck:  No RODNEY  Respiratory:  Lungs CTAB  Cardiovascular:  Heart RRR  Neuro:  Gross motor and sensory intact  Skin:  No suspicious lesions present  Breast: No masses, or axillary lymphadenopathy  Ext: + Piriformis stretch causes pain  Also minimal pain with SLR    Patient Care Team:  Ellie Garcia MD as PCP - General (Internal Medicine)  Ellie Garcia MD as PCP - [employer]age PCP  Jasson Lopes MD as Consulting Physician (Orthopaedic Surgery)  Sola Peter DO as Consulting Physician (Dermatology)  Daniel Donovan OD as Physician Assistant (Optometry)  Sanjay Queen MD as Referring Physician (Ophthalmology)       Health Risk Assessment:  Patient was asked if he/she has any difficulty performing the following activities of daily living:  Preparing nutritious food and eating? No  Grocery shopping? No  Bathing and grooming yourself? No  Getting dressed? No  Using the toilet?No  Using the phone? No  Moving around from place to place (physical ambulation)? No  Doing housework (including laundry) independently? No  Managing finances independently? No  Managing medications independently? No  Doing housework (including laundry) independently? No    Patient was asked if he/she:  Feels safe in current home environment?: Yes  Uses seatbelt? Yes  Sees the dentist regularly? Yes  Exercises regularly: Yes  Suffers from depression, stress, anger, loneliness or social isolation, pain, suicidality? No    Dietary issues discussed: Yes  Cognitive Impairment No  Hearing difficulties: Yes  Visual Acuity assessed: No    What is your self-assessment of overall health status and life satisfaction? Excellent     5 minutes spent on SDOH screening:   Specifically Housing, Food Insecurity, Utilities and Transportatin Needs were evaluated   (See Screenings in Rooming section for documentation)  Food Insecurity: No Food  Insecurity (6/13/2025)    Hunger Vital Sign     Worried About Running Out of Food in the Last Year: Never true     Ran Out of Food in the Last Year: Never true     Housing Stability: Unknown (6/13/2025)    Housing Stability Vital Sign     Unable to Pay for Housing in the Last Year: No     Number of Times Moved in the Last Year: Not on file     Homeless in the Last Year: No     Transportation Needs: No Transportation Needs (6/13/2025)    PRAPARE - Transportation     Lack of Transportation (Medical): No     Lack of Transportation (Non-Medical): No         Allergies and Medications  Atorvastatin, Hydrochlorothiazide, Hydrocodone-acetaminophen, Indomethacin, and Lisinopril  Current Outpatient Medications   Medication Instructions    acetaminophen (TYLENOL) 650 mg, oral, Every 6 hours PRN    amLODIPine (NORVASC) 2.5 mg, oral, Daily    APPLE CIDER VINEGAR ORAL 1 capsule, Daily    Bacillus subtilis-inulin 1.5 billion cell-1 gram tablet,chewable Bacillus subtilis-inulin 1.5 billion cell-1 gram tablet,chewable Chew. 0 02/17/2021 Active    calcium carbonate-vitamin D3 500 mg-3.125 mcg (125 unit) tablet tablet 1 tablet, Daily    cetirizine (ZYRTEC) 10 mg, Daily    cyanocobalamin (VITAMIN B-12) 100 mcg, Daily    latanoprost (Xalatan) 0.005 % ophthalmic solution 1 drop, Nightly    losartan (COZAAR) 25 mg, oral, Daily    multivitamin tablet 1 tablet, Daily    pantoprazole (PROTONIX) 20 mg, oral, Daily before breakfast, Do not crush, chew, or split.    rosuvastatin (Crestor) 10 mg tablet TAKE 1 TABLET Three Times WEEKLY    TURMERIC ORAL 1 tablet, Daily       Medications and Supplements  prescribed by me and other practitioners or clinical pharmacist (such as prescriptions, OTC's, herbal therapies and supplements) were reviewed and documented in the medical record.      Active Problem List  Problem List[1]    Comprehensive Medical/Surgical/Social/Family History  Medical History[2]  Surgical History[3]  Social History     Social  History Narrative    , 3 kids, 2 stepkids    Retired     Nonsmoker    ETOH:1 daily, and occasionally 2 drinks socially (advised to keep it to 1/day)    ---    Family History:    Josh: Breast Cancer       F:  CAD (CABG age 61), HTN, HPL, Bile Duct Cancer ( 67)                    M:   Coronary Artery Disease/CABG age 69, HTN, HPL ( age 89)            B: HPL, HTN, Allergies    B: HPL, CAD/PTCA, Cataract    S: HTN, Cataracts    S: Cataracts    Cousin:  leukemia                Tobacco/Alcohol/Opioid use, as well as Illicit Drug Use was screened for/reviewed and documented in Social Documentation section of the chart and medication list as appropriate     Depression Screening  Depression screening completed using the PHQ-2 questions, with results documented in the chart/encounter   Patient Health Questionnaire-2 Score: 0 (2025  8:01 AM)  (See also Rooming/Screening section for documentation, and/or progress note for additional information)    Cardiac Risk Assessment (15 minutes spent on this)  Cardiovascular risk was discussed and, if needed, lifestyle modifications recommended, including nutritional choices, exercise, and elimination of habits contributing to risk. We agreed on a plan to reduce the current cardiovascular risk based on above discussion as needed.     Aspirin use/disuse was discussed and documented in the Problem List of the medical record (under Cardiac Risk Counseling) after reviewing the updated guidelines below:  Consider low dose Aspirin ( mg) use if the benefit for cardiovascular disease prevention outweighs risk for bleeding complications.   Discussed that in general, low dose ASA should be considered:  In patients WITHOUT prior MI/stroke/PAD (primary prevention):   a. Age <60: Use if 10-year cardiovascular disease risk >20%, with discussion of risks and benefits with patient  b. Age 60-<70: Use if 10-year cardiovascular disease risk >20% and low bleeding  (e.g., gastrointestinal) risk, with discussion of risks and benefits with patient  c. Age >=70: Do not use    In patients WITH prior MI/stroke/PAD (secondary prevention):   Generally use unless extremely high bleeding (e.g., gastrointenstinal) risk, with discussion of risks and benefits with patient    Advance Directives Discussion  Advanced Care Planning (including a Living Will, Healthcare POA, as well as specific end of life choices and/or directives), was discussed with the patient and/or surrogate, voluntarily, and details of that discussion documented in the Problem List (under Advanced Directives Discussion) of the medical record.   (16 min spent discussing above)     During the course of the visit the patient was educated and counseled about age appropriate screening and preventive services.   Completed preventive screenings were documented in the chart (see Routine Health Maintenance in Problem List) and orders were placed for outstanding screenings/procedures as documented in the Assessment and Plan.    Patient Instructions (the written plan) was given to the patient at check out as part of the AVS.             [1]   Patient Active Problem List  Diagnosis    Routine adult health maintenance    Advanced directives, counseling/discussion    Cardiac risk counseling    Screening for multiple conditions    Abnormal screening cardiac CT    Allergic rhinitis    Arthritis    TMJ (temporomandibular joint disorder)    Disorder of bone density and structure, unspecified    Diverticulosis of colon    GERD without esophagitis    Hearing loss, bilateral    Hyperlipidemia    Hypertension, essential    Hyponatremia    Venous stasis of lower extremity    Vitamin D deficiency    H/O esophagitis    H/O esophageal ulcer    Cataract    Raynaud's phenomenon without gangrene    Alcohol screening    H/O nonmelanoma skin cancer    BMI 25.0-25.9,adult    Intrinsic eczema    Lumbar disc disease    Lumbar spondylosis    Personal  history of other venous thrombosis and embolism    Encounter for screening mammogram for breast cancer    Screening for colorectal cancer    Screen for colon cancer    Menopause    Encounter for screening involving social determinants of health (SDoH) [Z13.9]   [2]   Past Medical History:  Diagnosis Date    Abnormal screening cardiac CT     2018: CT calcium score =19    Abnormal screening cardiac CT 07/03/2024    1. Coronary artery calcium score of 97(LAD). 2. JEFFREY 62nd percentile** for age, gender, and race in asymptomatic patients.    H/O bone density study     12/21: T score- 2.0 FRAX* 10-year Probability of Fracture Based on femoral neck BMD: DualFemur (Left) Major Osteoporotic Fracture: 19.5% Hip Fracture:                4.3% 10/16: -1.8 12/19: Ten Year Major Osteoporotic Fracture Risk: 10.42%  Ten Year Hip Fracture Risk: 1.76%  T-Score: -1.8    H/O bone density study 10/06/2023    Lowest T score -1.9. 10-year Fracture Risk: Major Osteoporotic Fracture  12.2% Hip Fracture                        2.9%    H/O CT scan of abdomen     4/22: 1. Copious feces. 2. Diverticulosis. 3. Nonspecific fairly mild gastric through proximal jejunal distension. 4. Spinal degenerative changes include marked lower lumbar facet joint DJD accounting for grade I/IV spondylolisthesis of L4 on L5. Mild scoliosis. 5. A 1.6 cm wide umbilical hernia contains only fat. 6. Diastasis recti.    H/O magnetic resonance imaging of lumbar spine 05/31/2024    Multilevel discogenic degenerative changes of the lumbar spine, greater at L2-3.    Multilevel bulging disc with multilevel degenerative subluxations and dextroconvexity of the lumbar spine. These findings contribute to mild to moderate central canal narrowing at L3-4 and L4-5. There is multilevel neural foraminal narrowing bilaterally which is greatest at L2-3 on the left.    H/O x-ray of lumbar spine 12/19/2023    stable grade 1 anterolisthesis of L4 on L5 presence of spondylolisthesis.  Approximate 20 degrees of degenerative scoliotic curve noted. Multiple levels of degenerative disc disease and ventral osteophytes seen throughout. Mild osteoarthritic changes seen through the limited portions of the femoroacetabular joints.   [3]   Past Surgical History:  Procedure Laterality Date    APPENDECTOMY  09/23/2019    Appendectomy    BLADDER SUSPENSION  11/18/2019    CARDIAC CATHETERIZATION  11/18/2019    2010 - Normal    COLONOSCOPY  01/30/2020    1/15: diverticulosis (5yrs) 2009:mild diverticulosis sigmoid (7yrs) 1/20: diverticulosis    ESOPHAGOGASTRODUODENOSCOPY  11/19/2019    10/15: LA grade C reflux esophagitis 2/16: normal    FL GUIDED ASPIRATION OR INJECTION LARGE JOINT LEFT Left 12/11/2023    FL GUIDED ASPIRATION OR INJECTION LARGE JOINT LEFT 12/11/2023 Jose C Yoo MD [facility]    LUMBAR SPINE SURGERY  03/2025    for spinal stenosis    OTHER SURGICAL HISTORY  11/06/2018    Oral surgery    OTHER SURGICAL HISTORY  11/06/2018    Tonsillectomy    OTHER SURGICAL HISTORY  11/06/2018    Foot surgery    OTHER SURGICAL HISTORY  11/06/2018    Rotator cuff repair    OTHER SURGICAL HISTORY  09/23/2019    [place] tooth extraction    OTHER SURGICAL HISTORY  09/23/2019    Carpal tunnel surgery    OTHER SURGICAL HISTORY  09/23/2019    Colposcopy    TOTAL ABDOMINAL HYSTERECTOMY W/ BILATERAL SALPINGOOPHORECTOMY  11/18/2019    Hysterectomy    TOTAL KNEE ARTHROPLASTY  08/19/2019    Right - 1-2018    TOTAL KNEE ARTHROPLASTY Left 01/2025

## 2025-06-21 LAB — NONINV COLON CA DNA+OCC BLD SCRN STL QL: NEGATIVE

## 2025-07-09 ENCOUNTER — EVALUATION (OUTPATIENT)
Dept: PHYSICAL THERAPY | Facility: CLINIC | Age: 75
End: 2025-07-09
Payer: MEDICARE

## 2025-07-09 DIAGNOSIS — G57.01 PIRIFORMIS SYNDROME, RIGHT: Primary | ICD-10-CM

## 2025-07-09 DIAGNOSIS — M25.551 GREATER TROCHANTERIC PAIN SYNDROME OF RIGHT LOWER EXTREMITY: ICD-10-CM

## 2025-07-09 PROCEDURE — 97110 THERAPEUTIC EXERCISES: CPT | Mod: GP

## 2025-07-09 PROCEDURE — 97112 NEUROMUSCULAR REEDUCATION: CPT | Mod: GP

## 2025-07-09 PROCEDURE — 97162 PT EVAL MOD COMPLEX 30 MIN: CPT | Mod: GP

## 2025-07-09 NOTE — PROGRESS NOTES
Physical Therapy    Physical Therapy Evaluation    Patient Name: Carly Cheung  MRN: [id number]  Today's Date: 2025   confirmed verbally by patient.    Time Entry:   Time Calculation  Start Time: 1145  Stop Time: 1240  Time Calculation (min): 55 min  PT Evaluation Time Entry  PT Evaluation (Moderate) Time Entry: 30  PT Therapeutic Procedures Time Entry  Therapeutic Exercise Time Entry: 10  Neuromuscular Re-Education Time Entry: 15            Reason for Visit  Referred by: Ellie Garcia MD  Referral DX: Piriformis syndrome, right [G57.01]     Insurance:  Visit: #1  Authorized: to be determined  Payor/Plan: [employer] / [employer]       Current Problem  1. Piriformis syndrome, right  Referral to Physical Therapy    suspect this is cause for he rpain  will try PT +/- chiro          Subjective   Date of Onset: 2024   Chief Complaint: Right Gluteal pain     Patient presents to physical therapy with right gluteal pain following a history of lumbar decompression surgery in 2025. Although the surgery initially provided significant relief, the patient began experiencing increasing right-sided gluteal discomfort as activity levels progressed. She consulted with her surgeon, who suggested the symptoms may be muscular in nature. The patient previously responded well to an initial epidural injection lasting six months in , though a second injection yielded minimal benefit.     In an effort to self-manage, she began researching and performing targeted movements consistent with piriformis syndrome management, which temporarily reduced her pain. However, she seeks more consistent relief and professional guidance to remain accountable with exercises. She remains active, attending water aerobics three times per week and visiting the [facility] for recreational fitness, though she is currently limited to walking two miles due to pain. Aggravating factors include prolonged sitting,  standing, and walking--though symptoms ease with intermittent rest. Exercises such as clamshells, bridges, figure-4, and hamstring stretches have provided temporary relief. With a surgical history of right (2019) and left (2025) total knee replacements, and being retired, the patient’s primary goal is to eliminate pain and restore full function.        Pain:  Worst = 7/10  Pain Description: sharp localized to the right gluteal region   When out walking, prolonged sitting,   If she stands and waits for a second the pain can slowly go away   Best = /10   Pain Description:         Recent Imaging:  View in chart       Reviewed medical screening history form with patient: Yes,        Barriers Impacting Care:  None.    Precautions:  MILD procedure (march 19, 2025)   Left total knee replacement (Jan 2025)         Objective   Observation  Gait   R stance phase   Reduced hip extension with compensatory left pelvic rotation   Increased stance time   Antalgic posturing   L stance phase   Reduced stance phase        RANGE OF MOTION  Lumbar AROM  Flexion   Fingers to toes   Extension   25% of full motion without pain   Side bending   (L) =  fingers to mid thigh with reproduction of pain to the left hip   (R) =  fingers to mid thigh     Hip ROM   Flexion =   (L)  WNL A + P with soft end-feel   (R)  WNL A + P with soft end-feel   Extension   (L) = WNL A  (R) = WNL A  IR  (L) =  65 P with firm end-feel   (R) =  70 P with firm end-feel   ER  (L) =  45 P with muscular end-feel   (R) =  35 P with EMPTY end-feel      STRENGTH   Hip Specific MMT  TFL    (L) =  3+/5  (R) =  3+/5  Glute Max   (L) =  4-/5   (R) =  4-/5       SPECIAL TESTS  Intra-articular  (-) FADIR  (-) SCOUR    Hip / LBP  (+) PARMJIT  (-) SLR        FLEXIBILITY  Hamstring   L = positive   R = positive         Palpation:   TTP (RLE) = glute med mm belly, piriformis        Key Sign = PROM into hip ER and proximal hip strength         Outcome Measure   LEFS =  "58/80        Treatment   Therapeutic Exercise = 1 unit   Patient education   POC for course of treatment   Prognosis and rehabilitation / recovery timeline  Description of piriformis irritation & greater trochanteric pain syndrome as it relates to patient's current symptoms    NMR = 1 unit   Exercises   Hip abduction (black TB) isometric = 3x45\" holds   Sciatic nerve glides = 20x   Seated hip ER isometric = 2x45\" holds       Home Exercise Plan (HEP) ---- Access Code: [email or web address]  - Supine Bridge  - 1 x daily - 7 x weekly - 3-5 sets - 45-60 seconds hold  - Straight Leg Raise Sciatic Nerve Flossing  - 1-5 x daily - 7 x weekly - 2 sets - 20 reps  - Seated Hip ER Isometric  - 1 x daily - 7 x weekly - 3 sets - 45 seconds hold          Assessment  PT Diagnosis: Patient presents with signs and symptoms consistent with a combination of piriformis-related gluteal pain and greater trochanteric pain syndrome. Based on patient's subjective history and localization of pain it appears that patient's initial posterior gluteal pain likely resulted in increased tendon irritation and reduction in tolerance with gluteal activation required for walking and standing for prolonged periods of time. Objective findings that correlate with this suspicion include pain with palpation to both piriformis and greater trochanter, pain with left side bending, difficulty with single leg stance/balance as evidenced by gait observation, proximal hip weakness, and pain reproduction with passive hip ER.     Interventions initiated in session correlate to limitations found in objective assessment with patient tolerating all interventions, noting increased challenge and difficulty coordinating kinetic chain patterns together. Patient education also a large portion of treatment in session discussing with patient anatomical structures and typical pain presentation with both conditions. By end of session patient is independent with all interventions and is " agreeable to current POC. This is a low complexity evaluation involving the neuromuscular system (without additional comorbidities that would alter progression) with consideration for patient's recent surgical history as it pertains to muscle asymmetries during dynamic, multi-joint movement patterns. Skilled PT required to return to prior level of function and maximize functional outcome. Likely to benefit from skilled care.      Plan  Therapeutic exercises to improve hip and thoracolumbar ROM and strength of proximal hip and trunk musculature; neuromuscular re-education to promote proper engagement of proximal hip and trunk musculature; manual therapy for joint mobility and soft tissue tightness; therapeutic activity to promote return to prior level of function; biofeedback; gait training; dry needling; taping; cold/cryotherapy; hot packs; aquatic therapy; Patient plan will consist of 2 days/week for 12 weeks.    Next Visit Plan:  HEP   Assess response to initial HEP - alter if needed   ROM  Improve tolerance into PROM hip ER in supine   Motor Control / Neuromuscular Re-education   Proximal gluteal strength in both open and closed chain positions       Goals:  Patient will be independent with HEP.    OUTCOME MEASURE:  Patient will report improvement in LEFS outcome measure >67/80 in 4 weeks to meet MCID.      RANGE OF MOTION:  Patient will demonstrate WNL PROM of the hip into external rotation in 4 weeks to improve tolerance with movements requiring gluteal activation such as walking and stair navigation.     MOTOR CONTROL / Neuromuscular Re-education:  Patient will demonstrate at least 4/5 proximal hip MMT in 4 weeks to improve strength of hip and trunk required for functional movement/postures such as walking and stair navigation.     PAIN:  Patient will report worse pain levels of <3/10 in 4 weeks to reflect positive changes in symptom reproduction and subsequently enable greater ease with functional movement  patterns.             Rai Tatum PT, DPT

## 2025-07-14 ENCOUNTER — TREATMENT (OUTPATIENT)
Dept: PHYSICAL THERAPY | Facility: CLINIC | Age: 75
End: 2025-07-14
Payer: MEDICARE

## 2025-07-14 DIAGNOSIS — G57.01 PIRIFORMIS SYNDROME, RIGHT: ICD-10-CM

## 2025-07-14 DIAGNOSIS — M25.551 GREATER TROCHANTERIC PAIN SYNDROME OF RIGHT LOWER EXTREMITY: ICD-10-CM

## 2025-07-14 PROCEDURE — 97140 MANUAL THERAPY 1/> REGIONS: CPT | Mod: GP | Performed by: PHYSICAL THERAPIST

## 2025-07-14 PROCEDURE — 97110 THERAPEUTIC EXERCISES: CPT | Mod: GP | Performed by: PHYSICAL THERAPIST

## 2025-07-14 ASSESSMENT — PAIN - FUNCTIONAL ASSESSMENT: PAIN_FUNCTIONAL_ASSESSMENT: 0-10

## 2025-07-14 NOTE — PROGRESS NOTES
Physical Therapy    Physical Therapy Treatment    Patient Name: Carly Cheung  MRN: [id number]  Today's Date: 7/14/2025  Time Entry:   Time Calculation  Start Time: 0130  Stop Time: 0220  Time Calculation (min): 50 min   PT Therapeutic Procedures Time Entry  Therapeutic Exercise Time Entry: 20  Manual Therapy Time Entry: 30   Visit 1 of 6 Authorized through 9-11-25      Assessment:  Increased hip ER noted.  No pain with transitions after treatment.  Demonstrates understanding of HEP and LE alignment during transitions.       Plan:  Continue to progress as tolerated.       Current Problem  1. Piriformis syndrome, right  Follow Up In Physical Therapy    Follow Up In Physical Therapy    suspect this is cause for he rpain  will try PT +/- chiro      2. Greater trochanteric pain syndrome of right lower extremity  Follow Up In Physical Therapy    Follow Up In Physical Therapy            Subjective    Patient reports improvement since her initial visit.      Precautions  Precautions Comment: No falls.     Pain  Pain Assessment  Pain Assessment: 0-10.  2 with transitional movements in right hip.      Objective   Right Hip ER in supine 45.       Treatments:  TherX-  HEP Reviewed and demonstrated.  Cues for knee position during seated hip ER isometric.  Discussed LE control during sit-stand transfers.  Patient to be mindful of LE IR.      MTT-  Supine:  90/90 hamstring flossing.  Manual Hip ER/F4 stretch.  Muscle bending to adductors.  TP releases to obturator, piriformis, gluts, hamstring origin.   LSL:  TP releases piriformis/gluts.  Manual hip flexor stretch with anterior hip glides.

## 2025-07-22 ENCOUNTER — TREATMENT (OUTPATIENT)
Dept: PHYSICAL THERAPY | Facility: CLINIC | Age: 75
End: 2025-07-22
Payer: MEDICARE

## 2025-07-22 DIAGNOSIS — M25.551 GREATER TROCHANTERIC PAIN SYNDROME OF RIGHT LOWER EXTREMITY: ICD-10-CM

## 2025-07-22 DIAGNOSIS — G57.01 PIRIFORMIS SYNDROME, RIGHT: ICD-10-CM

## 2025-07-22 PROCEDURE — 97112 NEUROMUSCULAR REEDUCATION: CPT | Mod: GP

## 2025-07-22 PROCEDURE — 97110 THERAPEUTIC EXERCISES: CPT | Mod: GP

## 2025-07-22 NOTE — PROGRESS NOTES
Physical Therapy    Physical Therapy Treatment    Patient Name: Carly Cheung  MRN: [id number]  Today's Date: 7/22/2025  Time Calculation  Start Time: 1438  Stop Time: 1520  Time Calculation (min): 42 min        Insurance:  Visit: #3  Authorized: 6  Certification: 7/11/2025 - 7/11/2026   Payor: [employer] / Plan: [employer] / Product Type: *No Product type* /       Subjective:  Patient reports to physical therapy with noticeable reduction in overall pain levels after manual therapy performed last session. However a few days later the pain returned to the same degree and she took a day off which helped bring her back to baseline. With symptoms returning back to baseline she performed the initial HEP leading to increased achiness and discomfort to the right hip and gluteal region.     Patient confirms that when performing seated hip ER by the 3rd set this creates patient's reproducible pain and not muscle related pain, however when she stops the pain goes away.        Current pain: 2/10; Range: 2-5/10        Objective:  Supine PROM  ER = 55 degrees with painful muscle stretch to right gluteal region     Seated hip ER AROM  Compensatory hip adduction that improves with two hands between the thighs        Treatment:  Therapeutic Exercise =2 unit  Added to HEP   Tennis ball to gluteals and hip ER with single leg knee to chest x15  Seated hip ER (RTB) = 2x8  Patient education   Reducing leg crossing over the other as part of clinical symptoms   Elimination of figure 4 position to avoid further irritation to gluteal tendons  Updated HEP - dosage and parameters     Neuromuscular Re-education = 1 unit   Review of HEP   Sciatic nerve glide = 2x20   Single leg bridge (glute set) = 2x8       HEP: #3 ----Access Code: [email or web address]  - Tennis Ball with Knee to Chest  - 1 x daily - 7 x weekly - 1 sets - 15 reps  - Straight Leg Raise Sciatic Nerve Flossing  - 1-5 x daily - 7 x weekly - 2 sets - 20 reps  - Seated Hip  ER with Band  - 1 x daily - 7 x weekly - 3 sets - 8 reps  - Single Leg Bridge  - 1 x daily - 4-5 x weekly - 2 sets - 8 reps          Diagnosis:  1. Piriformis syndrome, right    2. Greater trochanteric pain syndrome of right lower extremity          Assessment:   Pt demonstrates tolerance with progressions in session, transitioning from isometric movements to isotonic both into hip external rotation and hip extension. Minimal pain is most noticeably experienced with single leg glute bridges, but pain is eliminated upon termination of exercise, supporting low tissue irritability. Patient will continue to benefit from physical therapy services in order to maximize functional outcomes.           Plan:  Next plan   Strength   Improve strength with hip ER in sitting position slowly increasing resistance based on tissue response   Hip strength - lateral and posterior in both open and closed chain   STM/IASTM  Gluteal tendons and muscle bellies   Greater trochanter with distal gluteal tendons         Goals:  Patient will be independent with HEP.  Met      OUTCOME MEASURE:  Patient will report improvement in LEFS outcome measure >67/80 in 4 weeks to meet MCID.       RANGE OF MOTION:  Patient will demonstrate WNL PROM of the hip into external rotation in 4 weeks to improve tolerance with movements requiring gluteal activation such as walking and stair navigation.   MET   Patient will demonstrate symmetrical PROM of the hip into external rotation in 8 weeks to improve tolerance with movements requiring gluteal activation such as walking and stair navigation.   50% met      MOTOR CONTROL / Neuromuscular Re-education:  Patient will demonstrate at least 4/5 proximal hip MMT in 4 weeks to improve strength of hip and trunk required for functional movement/postures such as walking and stair navigation.      PAIN:  Patient will report worse pain levels of <3/10 in 4 weeks to reflect positive changes in symptom reproduction and  subsequently enable greater ease with functional movement patterns.   15% met           Rai Tatum, PT

## 2025-07-29 ENCOUNTER — APPOINTMENT (OUTPATIENT)
Dept: PHYSICAL THERAPY | Facility: CLINIC | Age: 75
End: 2025-07-29
Payer: MEDICARE

## 2025-08-11 ENCOUNTER — TREATMENT (OUTPATIENT)
Dept: PHYSICAL THERAPY | Facility: CLINIC | Age: 75
End: 2025-08-11
Payer: MEDICARE

## 2025-08-11 DIAGNOSIS — M25.551 GREATER TROCHANTERIC PAIN SYNDROME OF RIGHT LOWER EXTREMITY: ICD-10-CM

## 2025-08-11 DIAGNOSIS — G57.01 PIRIFORMIS SYNDROME, RIGHT: ICD-10-CM

## 2025-08-11 PROCEDURE — 97112 NEUROMUSCULAR REEDUCATION: CPT | Mod: GP

## 2025-08-11 PROCEDURE — 97110 THERAPEUTIC EXERCISES: CPT | Mod: GP

## 2025-08-20 ENCOUNTER — TREATMENT (OUTPATIENT)
Dept: PHYSICAL THERAPY | Facility: CLINIC | Age: 75
End: 2025-08-20
Payer: MEDICARE

## 2025-08-20 DIAGNOSIS — M25.551 GREATER TROCHANTERIC PAIN SYNDROME OF RIGHT LOWER EXTREMITY: ICD-10-CM

## 2025-08-20 DIAGNOSIS — G57.01 PIRIFORMIS SYNDROME, RIGHT: ICD-10-CM

## 2025-08-20 PROCEDURE — 97110 THERAPEUTIC EXERCISES: CPT | Mod: GP

## 2025-08-20 PROCEDURE — 97112 NEUROMUSCULAR REEDUCATION: CPT | Mod: GP

## 2026-07-07 ENCOUNTER — APPOINTMENT (OUTPATIENT)
Dept: PRIMARY CARE | Facility: CLINIC | Age: 76
End: 2026-07-07
Payer: MEDICARE